# Patient Record
Sex: FEMALE | Race: WHITE | NOT HISPANIC OR LATINO | Employment: OTHER | URBAN - METROPOLITAN AREA
[De-identification: names, ages, dates, MRNs, and addresses within clinical notes are randomized per-mention and may not be internally consistent; named-entity substitution may affect disease eponyms.]

---

## 2017-01-17 ENCOUNTER — HOSPITAL ENCOUNTER (EMERGENCY)
Facility: HOSPITAL | Age: 82
Discharge: HOME | End: 2017-01-17
Attending: EMERGENCY MEDICINE | Admitting: EMERGENCY MEDICINE

## 2017-01-17 ENCOUNTER — APPOINTMENT (OUTPATIENT)
Dept: GENERAL RADIOLOGY | Facility: HOSPITAL | Age: 82
End: 2017-01-17

## 2017-01-17 ENCOUNTER — APPOINTMENT (OUTPATIENT)
Dept: CT IMAGING | Facility: HOSPITAL | Age: 82
End: 2017-01-17

## 2017-01-17 VITALS
HEIGHT: 66 IN | TEMPERATURE: 98.1 F | RESPIRATION RATE: 16 BRPM | OXYGEN SATURATION: 95 % | SYSTOLIC BLOOD PRESSURE: 156 MMHG | WEIGHT: 160 LBS | HEART RATE: 84 BPM | BODY MASS INDEX: 25.71 KG/M2 | DIASTOLIC BLOOD PRESSURE: 66 MMHG

## 2017-01-17 DIAGNOSIS — S51.011A LACERATION OF RIGHT ELBOW, INITIAL ENCOUNTER: ICD-10-CM
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
## 2017-01-17 LAB
ALBUMIN SERPL-MCNC: 3.8 G/DL (ref 3.2–4.8)
ALBUMIN/GLOB SERPL: 1.4 G/DL (ref 1.5–2.5)
ALP SERPL-CCNC: 74 U/L (ref 25–100)
ALT SERPL W P-5'-P-CCNC: 9 U/L (ref 7–40)
ANION GAP SERPL CALCULATED.3IONS-SCNC: 7 MMOL/L (ref 3–11)
AST SERPL-CCNC: 20 U/L (ref 0–33)
BACTERIA UR QL AUTO: ABNORMAL /HPF
BASOPHILS # BLD AUTO: 0.03 10*3/MM3 (ref 0–0.2)
BASOPHILS NFR BLD AUTO: 0.4 % (ref 0–1)
BILIRUB SERPL-MCNC: 0.4 MG/DL (ref 0.3–1.2)
BILIRUB UR QL STRIP: NEGATIVE
BUN BLD-MCNC: 19 MG/DL (ref 9–23)
BUN/CREAT SERPL: 15.8 (ref 7–25)
CALCIUM SPEC-SCNC: 9.6 MG/DL (ref 8.7–10.4)
CHLORIDE SERPL-SCNC: 106 MMOL/L (ref 99–109)
CK SERPL-CCNC: 91 U/L (ref 26–174)
CLARITY UR: ABNORMAL
CO2 SERPL-SCNC: 30 MMOL/L (ref 20–31)
COLOR UR: YELLOW
CREAT BLD-MCNC: 1.2 MG/DL (ref 0.6–1.3)
DEPRECATED RDW RBC AUTO: 44.4 FL (ref 37–54)
EOSINOPHIL # BLD AUTO: 0.09 10*3/MM3 (ref 0.1–0.3)
EOSINOPHIL NFR BLD AUTO: 1.3 % (ref 0–3)
ERYTHROCYTE [DISTWIDTH] IN BLOOD BY AUTOMATED COUNT: 12.7 % (ref 11.3–14.5)
GFR SERPL CREATININE-BSD FRML MDRD: 42 ML/MIN/1.73
GLOBULIN UR ELPH-MCNC: 2.7 GM/DL
GLUCOSE BLD-MCNC: 78 MG/DL (ref 70–100)
GLUCOSE UR STRIP-MCNC: NEGATIVE MG/DL
HCT VFR BLD AUTO: 32.7 % (ref 34.5–44)
HGB BLD-MCNC: 10.7 G/DL (ref 11.5–15.5)
HGB UR QL STRIP.AUTO: NEGATIVE
HOLD SPECIMEN: NORMAL
HOLD SPECIMEN: NORMAL
HYALINE CASTS UR QL AUTO: ABNORMAL /LPF
IMM GRANULOCYTES # BLD: 0.01 10*3/MM3 (ref 0–0.03)
IMM GRANULOCYTES NFR BLD: 0.1 % (ref 0–0.6)
KETONES UR QL STRIP: NEGATIVE
LEUKOCYTE ESTERASE UR QL STRIP.AUTO: NEGATIVE
LYMPHOCYTES # BLD AUTO: 1.79 10*3/MM3 (ref 0.6–4.8)
LYMPHOCYTES NFR BLD AUTO: 25.7 % (ref 24–44)
MCH RBC QN AUTO: 31.4 PG (ref 27–31)
MCHC RBC AUTO-ENTMCNC: 32.7 G/DL (ref 32–36)
MCV RBC AUTO: 95.9 FL (ref 80–99)
MONOCYTES # BLD AUTO: 0.73 10*3/MM3 (ref 0–1)
MONOCYTES NFR BLD AUTO: 10.5 % (ref 0–12)
NEUTROPHILS # BLD AUTO: 4.31 10*3/MM3 (ref 1.5–8.3)
NEUTROPHILS NFR BLD AUTO: 62 % (ref 41–71)
NITRITE UR QL STRIP: NEGATIVE
PH UR STRIP.AUTO: 5.5 [PH] (ref 5–8)
PLATELET # BLD AUTO: 230 10*3/MM3 (ref 150–450)
PMV BLD AUTO: 10.4 FL (ref 6–12)
POTASSIUM BLD-SCNC: 4.1 MMOL/L (ref 3.5–5.5)
PROT SERPL-MCNC: 6.5 G/DL (ref 5.7–8.2)
PROT UR QL STRIP: NEGATIVE
RBC # BLD AUTO: 3.41 10*6/MM3 (ref 3.89–5.14)
RBC # UR: ABNORMAL /HPF
REF LAB TEST METHOD: ABNORMAL
SODIUM BLD-SCNC: 143 MMOL/L (ref 132–146)
SP GR UR STRIP: 1.01 (ref 1–1.03)
SQUAMOUS #/AREA URNS HPF: ABNORMAL /HPF
TROPONIN I SERPL-MCNC: 0.1 NG/ML (ref 0–0.07)
TROPONIN I SERPL-MCNC: 0.11 NG/ML (ref 0–0.07)
UROBILINOGEN UR QL STRIP: ABNORMAL
WBC NRBC COR # BLD: 6.96 10*3/MM3 (ref 3.5–10.8)
WBC UR QL AUTO: ABNORMAL /HPF
WHOLE BLOOD HOLD SPECIMEN: NORMAL
WHOLE BLOOD HOLD SPECIMEN: NORMAL

## 2017-01-17 PROCEDURE — 80053 COMPREHEN METABOLIC PANEL: CPT | Performed by: EMERGENCY MEDICINE

## 2017-01-17 PROCEDURE — 70450 CT HEAD/BRAIN W/O DYE: CPT

## 2017-01-17 PROCEDURE — 82550 ASSAY OF CK (CPK): CPT | Performed by: EMERGENCY MEDICINE

## 2017-01-17 PROCEDURE — 73070 X-RAY EXAM OF ELBOW: CPT

## 2017-01-17 PROCEDURE — 71010 HC CHEST PA OR AP: CPT

## 2017-01-17 PROCEDURE — 84484 ASSAY OF TROPONIN QUANT: CPT

## 2017-01-17 PROCEDURE — 81001 URINALYSIS AUTO W/SCOPE: CPT | Performed by: EMERGENCY MEDICINE

## 2017-01-17 PROCEDURE — 99285 EMERGENCY DEPT VISIT HI MDM: CPT

## 2017-01-17 PROCEDURE — 85025 COMPLETE CBC W/AUTO DIFF WBC: CPT | Performed by: EMERGENCY MEDICINE

## 2017-01-17 PROCEDURE — 36415 COLL VENOUS BLD VENIPUNCTURE: CPT

## 2017-01-17 PROCEDURE — 93005 ELECTROCARDIOGRAM TRACING: CPT | Performed by: EMERGENCY MEDICINE

## 2017-01-17 RX ORDER — SODIUM CHLORIDE 0.9 % (FLUSH) 0.9 %
10 SYRINGE (ML) INJECTION AS NEEDED
Status: DISCONTINUED | OUTPATIENT
Start: 2017-01-17 | End: 2017-01-17 | Stop reason: HOSPADM

## 2017-01-17 RX ORDER — PANTOPRAZOLE SODIUM 20 MG/1
20 TABLET, DELAYED RELEASE ORAL DAILY
COMMUNITY
End: 2017-04-04

## 2017-01-17 RX ORDER — LIDOCAINE HYDROCHLORIDE 10 MG/ML
10 INJECTION, SOLUTION INFILTRATION; PERINEURAL ONCE
Status: COMPLETED | OUTPATIENT
Start: 2017-01-17 | End: 2017-01-17

## 2017-01-17 RX ORDER — LIDOCAINE HYDROCHLORIDE 10 MG/ML
INJECTION, SOLUTION EPIDURAL; INFILTRATION; INTRACAUDAL; PERINEURAL
Status: COMPLETED
Start: 2017-01-17 | End: 2017-01-17

## 2017-01-17 RX ADMIN — LIDOCAINE HYDROCHLORIDE 5 ML: 10 INJECTION, SOLUTION EPIDURAL; INFILTRATION; INTRACAUDAL; PERINEURAL at 03:26

## 2017-01-17 RX ADMIN — LIDOCAINE HYDROCHLORIDE 5 ML: 10 INJECTION, SOLUTION INFILTRATION; PERINEURAL at 03:26

## 2017-01-17 NOTE — ED PROVIDER NOTES
Subjective   History of Present Illness    Review of Systems    Past Medical History   Diagnosis Date   • GERD (gastroesophageal reflux disease)    • [place]hard of hearing)        Allergies   Allergen Reactions   • Asa [Aspirin]        Past Surgical History   Procedure Laterality Date   • Joint replacement         History reviewed. No pertinent family history.    Social History     Social History   • Marital status:      Spouse name: N/A   • Number of children: N/A   • Years of education: N/A     Social History Main Topics   • Smoking status: Never Smoker   • Smokeless tobacco: None   • Alcohol use No   • Drug use: None   • Sexual activity: Not Asked     Other Topics Concern   • None     Social History Narrative   • None           Objective   Physical Exam    Laceration Repair  Date/Time: 1/17/2017 3:51 AM  Performed by: AMBROCIO FIGUEROA  Authorized by: BIRDIE PEREZ   Consent: Verbal consent obtained.  Risks and benefits: risks, benefits and alternatives were discussed  Consent given by: patient  Body area: upper extremity  Location details: right elbow  Laceration length: 8 cm  Foreign bodies: no foreign bodies  Anesthesia: local infiltration    Anesthesia:  Anesthesia: local infiltration  Local Anesthetic: lidocaine 1% without epinephrine   Anesthetic total: 8 mL  Preparation: Patient was prepped and draped in the usual sterile fashion.  Irrigation solution: saline  Irrigation method: syringe  Amount of cleaning: extensive  Debridement: none  Skin closure: 4-0 nylon  Number of sutures: 13  Technique: simple  Approximation: close  Approximation difficulty: simple  Dressing: 4x4 sterile gauze  Patient tolerance: Patient tolerated the procedure well with no immediate complications               ED Course  ED Course                  MDM    Final diagnoses:   None            NAZARIO Villalba  01/17/17 0353

## 2017-01-17 NOTE — ED PROVIDER NOTES
Subjective   HPI Comments: Meche Duarte is a 89 y.o.female who presents to the ED after a fall. Pt states that she went to bed, lost her balance and fell backwards, injured her right arm and was unable to get herself up. She laid on the floor until her family was able to hear her but she is unsure how long she was down. In the ED she states that she has a laceration to her right elbow but is largely pain free. She denies any neck pain, back pain, CP, abd pain, NVD, fevers or other acute complaints.     At baseline, pt is ambulatory with a walker.     Patient is a 89 y.o. female presenting with fall.   History provided by:  Patient  Fall   Mechanism of injury: fall    Injury location:  Shoulder/arm  Shoulder/arm injury location:  R elbow  Incident location:  Home  Time since incident: several hours ago.  Arrived directly from scene: yes    Fall:     Fall occurred: lost balance.    Entrapped after fall: no    Suspicion of alcohol use: no    Suspicion of drug use: no    Prior to arrival data:     Bystander interventions:  None    Patient ambulatory at scene: no      Loss of consciousness: no      Amnesic to event: no    Associated symptoms: no abdominal pain, no back pain, no chest pain, no loss of consciousness, no nausea, no neck pain, no seizures and no vomiting        Review of Systems   Cardiovascular: Negative for chest pain.   Gastrointestinal: Negative for abdominal pain, nausea and vomiting.   Musculoskeletal: Negative for back pain and neck pain.   Skin:        laceration   Neurological: Negative for seizures, loss of consciousness, syncope, weakness and numbness.   All other systems reviewed and are negative.      Past Medical History   Diagnosis Date   • GERD (gastroesophageal reflux disease)    • [place] (hard of hearing)        Allergies   Allergen Reactions   • Asa [Aspirin]        Past Surgical History   Procedure Laterality Date   • Joint replacement         History reviewed. No pertinent family  history.    Social History     Social History   • Marital status:      Spouse name: N/A   • Number of children: N/A   • Years of education: N/A     Social History Main Topics   • Smoking status: Never Smoker   • Smokeless tobacco: None   • Alcohol use No   • Drug use: None   • Sexual activity: Not Asked     Other Topics Concern   • None     Social History Narrative   • None         Objective   Physical Exam   Constitutional: She is oriented to person, place, and time. She appears well-developed and well-nourished. No distress.   HENT:   Head: Normocephalic and atraumatic.   Eyes: Conjunctivae are normal.   Neck: Trachea normal, normal range of motion and phonation normal. Neck supple. No JVD present.   Cardiovascular: Normal rate and regular rhythm.    Murmur (3/6 systolic murmur ) heard.  Pulmonary/Chest: Effort normal and breath sounds normal. No respiratory distress.   Abdominal: Soft. There is no tenderness.   Musculoskeletal: Normal range of motion. She exhibits no edema or deformity.   No significant TTP   Neurological: She is alert and oriented to person, place, and time. She has normal strength. Coordination normal.   able to lift all extremities against gravity.    Skin: Skin is warm and dry. She is not diaphoretic. No pallor.   large 4 cm laceration right elbow   Psychiatric: She has a normal mood and affect. Her speech is normal and behavior is normal.   Nursing note and vitals reviewed.      Procedures         ED Course  ED Course   Value Comment By Time   SCANNED EKG (Reviewed) Frantz Moore 01/17 0357       Course of Care      Lab Results (last 24 hours)     Procedure Component Value Units Date/Time    CK [[id number]]  (Normal) Collected:  01/17/17 0410    Specimen:  Blood Updated:  01/17/17 0446     Creatine Kinase 91 U/L     CBC & Differential [[id number]] Collected:  01/17/17 0410    Specimen:  Blood Updated:  01/17/17 0438    Narrative:       The following orders were created for panel order CBC &  Differential.  Procedure                               Abnormality         Status                     ---------                               -----------         ------                     CBC Auto Differential[[id number]]         Abnormal            Final result                 Please view results for these tests on the individual orders.    Comprehensive Metabolic Panel [[id number]]  (Abnormal) Collected:  01/17/17 0410    Specimen:  Blood Updated:  01/17/17 0446     Glucose 78 mg/dL      BUN 19 mg/dL      Creatinine 1.20 mg/dL      Sodium 143 mmol/L      Potassium 4.1 mmol/L      Chloride 106 mmol/L      CO2 30.0 mmol/L      Calcium 9.6 mg/dL      Total Protein 6.5 g/dL      Albumin 3.80 g/dL      ALT (SGPT) 9 U/L      AST (SGOT) 20 U/L      Alkaline Phosphatase 74 U/L      Total Bilirubin 0.4 mg/dL      eGFR Non African Amer 42 (L) mL/min/1.73      Globulin 2.7 gm/dL      A/G Ratio 1.4 (L) g/dL      BUN/Creatinine Ratio 15.8      Anion Gap 7.0 mmol/L     Narrative:       [employer] Guidelines    Stage                           Description                             GFR                      1                               Normal or High                          90+  2                               Mild decrease                            60-89  3                               Moderate decrease                   30-59  4                               Severe decrease                       15-29  5                               Kidney failure                             <15    CBC Auto Differential [[id number]]  (Abnormal) Collected:  01/17/17 0410    Specimen:  Blood Updated:  01/17/17 0438     WBC 6.96 10*3/mm3      RBC 3.41 (L) 10*6/mm3      Hemoglobin 10.7 (L) g/dL      Hematocrit 32.7 (L) %      MCV 95.9 fL      MCH 31.4 (H) pg      MCHC 32.7 g/dL      RDW 12.7 %      RDW-SD 44.4 fl      MPV 10.4 fL      Platelets 230 10*3/mm3      Neutrophil % 62.0 %      Lymphocyte % 25.7 %      Monocyte % 10.5 %       Eosinophil % 1.3 %      Basophil % 0.4 %      Immature Grans % 0.1 %      Neutrophils, Absolute 4.31 10*3/mm3      Lymphocytes, Absolute 1.79 10*3/mm3      Monocytes, Absolute 0.73 10*3/mm3      Eosinophils, Absolute 0.09 (L) 10*3/mm3      Basophils, Absolute 0.03 10*3/mm3      Immature Grans, Absolute 0.01 10*3/mm3     Urinalysis With / Culture If Indicated [[id number]]  (Abnormal) Collected:  01/17/17 0412    Specimen:  Urine from Urine, Catheter Updated:  01/17/17 0509     Color, UA Yellow      Appearance, UA Cloudy (A)      pH, UA 5.5      Specific Gravity, UA 1.008      Glucose, UA Negative      Ketones, UA Negative      Bilirubin, UA Negative      Blood, UA Negative      Protein, UA Negative      Leuk Esterase, UA Negative      Nitrite, UA Negative      Urobilinogen, UA 0.2 E.U./dL     Urinalysis, Microscopic Only [[id number]]  (Abnormal) Collected:  01/17/17 0412    Specimen:  Urine from Urine, Catheter Updated:  01/17/17 0509     RBC, UA 3-6 (A) /HPF      WBC, UA 0-2 (A) /HPF      Bacteria, UA None Seen /HPF      Squamous Epithelial Cells, UA 0-2 /HPF      Hyaline Casts, UA 0-6 /LPF      Methodology Automated Microscopy     POC Troponin, Rapid [[id number]]  (Abnormal) Collected:  01/17/17 0415    Specimen:  Blood Updated:  01/17/17 0435     Troponin I 0.10 (H) ng/mL       Serial Number: [id number]    : [id number]       POC Troponin, Rapid [[id number]]  (Abnormal) Collected:  01/17/17 0637    Specimen:  Blood Updated:  01/17/17 0655     Troponin I 0.11 (H) ng/mL       Serial Number: [id number]    : [id number]             Note: In addition to lab results from this visit, the labs listed above may include labs taken at another facility or during a different encounter within the last 24 hours. Please correlate lab times with ED admission and discharge times for further clarification of the services performed during this visit.    CT Head Without Contrast   Final Result   Abnormal     No acute intracranial  findings.         THIS DOCUMENT HAS BEEN ELECTRONICALLY SIGNED BY BIANCA NATHAN MD      XR Chest 1 View    (Results Pending)   XR Elbow 2 View Right    (Results Pending)       Vitals:    01/17/17 0600 01/17/17 0658 01/17/17 0700 01/17/17 0703   BP: 161/66  156/66    BP Location:       Pulse: 62 77  84   Resp:       Temp:       TempSrc:       SpO2: 95% 98% 95% 95%   Weight:       Height:           Medications   sodium chloride 0.9 % flush 10 mL (not administered)   lidocaine (XYLOCAINE) 1 % injection 10 mL (5 mL Injection Given 1/17/17 0326)       ECG/EMG Results (last 24 hours)     Procedure Component Value Units Date/Time    ECG 12 Lead [[id number]] Collected:  01/17/17 0624     Updated:  01/17/17 0703    ECG 12 Lead [[id number]] Collected:  01/17/17 0257     Updated:  01/17/17 0704                          MDM    Final diagnoses:   Fall from standing, initial encounter   Laceration of right elbow, initial encounter       Documentation assistance provided by oziel Moore.  Information recorded by the scribe was done at my direction and has been verified and validated by me.     Frantz Moore  01/17/17 0250       Jarrett Gillette DO  01/17/17 0809

## 2017-04-04 ENCOUNTER — HOSPITAL ENCOUNTER (INPATIENT)
Facility: HOSPITAL | Age: 82
LOS: 3 days | Discharge: NURSING, REHAB OR LONG-TERM CARE | End: 2017-04-07
Attending: EMERGENCY MEDICINE | Admitting: HOSPITALIST

## 2017-04-04 ENCOUNTER — APPOINTMENT (OUTPATIENT)
Dept: CT IMAGING | Facility: HOSPITAL | Age: 82
End: 2017-04-04

## 2017-04-04 ENCOUNTER — APPOINTMENT (OUTPATIENT)
Dept: MRI IMAGING | Facility: HOSPITAL | Age: 82
End: 2017-04-04

## 2017-04-04 ENCOUNTER — APPOINTMENT (OUTPATIENT)
Dept: GENERAL RADIOLOGY | Facility: HOSPITAL | Age: 82
End: 2017-04-04

## 2017-04-04 DIAGNOSIS — R41.82 ALTERED MENTAL STATUS, UNSPECIFIED ALTERED MENTAL STATUS TYPE: ICD-10-CM

## 2017-04-04 DIAGNOSIS — Z78.9 IMPAIRED MOBILITY AND ADLS: ICD-10-CM

## 2017-04-04 DIAGNOSIS — R53.1 WEAKNESS: Primary | ICD-10-CM

## 2017-04-04 DIAGNOSIS — Z74.09 IMPAIRED MOBILITY AND ADLS: ICD-10-CM

## 2017-04-04 DIAGNOSIS — J18.9 PNEUMONIA OF LEFT LOWER LOBE DUE TO INFECTIOUS ORGANISM: ICD-10-CM

## 2017-04-04 PROBLEM — R06.02 SHORTNESS OF BREATH: Status: ACTIVE | Noted: 2017-04-04

## 2017-04-04 PROBLEM — H91.90 HOH (HARD OF HEARING): Status: ACTIVE | Noted: 2017-04-04

## 2017-04-04 PROBLEM — I10 HTN (HYPERTENSION): Status: ACTIVE | Noted: 2017-04-04

## 2017-04-04 PROBLEM — N17.9 AKI (ACUTE KIDNEY INJURY) (HCC): Status: ACTIVE | Noted: 2017-04-04

## 2017-04-04 PROBLEM — G25.81 RLS (RESTLESS LEGS SYNDROME): Status: ACTIVE | Noted: 2017-04-04

## 2017-04-04 PROBLEM — J45.909 ASTHMA: Status: ACTIVE | Noted: 2017-04-04

## 2017-04-04 LAB
ALBUMIN SERPL-MCNC: 3.7 G/DL (ref 3.2–4.8)
ALBUMIN/GLOB SERPL: 1.3 G/DL (ref 1.5–2.5)
ALP SERPL-CCNC: 79 U/L (ref 25–100)
ALT SERPL W P-5'-P-CCNC: 3 U/L (ref 7–40)
ANION GAP SERPL CALCULATED.3IONS-SCNC: 10 MMOL/L (ref 3–11)
AST SERPL-CCNC: 19 U/L (ref 0–33)
BASOPHILS # BLD AUTO: 0.03 10*3/MM3 (ref 0–0.2)
BASOPHILS NFR BLD AUTO: 0.5 % (ref 0–1)
BILIRUB SERPL-MCNC: 0.3 MG/DL (ref 0.3–1.2)
BILIRUB UR QL STRIP: NEGATIVE
BNP SERPL-MCNC: 200 PG/ML (ref 0–100)
BUN BLD-MCNC: 19 MG/DL (ref 9–23)
BUN/CREAT SERPL: 13.6 (ref 7–25)
CALCIUM SPEC-SCNC: 9.3 MG/DL (ref 8.7–10.4)
CHLORIDE SERPL-SCNC: 105 MMOL/L (ref 99–109)
CLARITY UR: CLEAR
CO2 SERPL-SCNC: 26 MMOL/L (ref 20–31)
COLOR UR: YELLOW
CREAT BLD-MCNC: 1.4 MG/DL (ref 0.6–1.3)
D-LACTATE SERPL-SCNC: 0.6 MMOL/L (ref 0.5–2)
DEPRECATED RDW RBC AUTO: 47.2 FL (ref 37–54)
EOSINOPHIL # BLD AUTO: 0.1 10*3/MM3 (ref 0.1–0.3)
EOSINOPHIL NFR BLD AUTO: 1.8 % (ref 0–3)
ERYTHROCYTE [DISTWIDTH] IN BLOOD BY AUTOMATED COUNT: 13.2 % (ref 11.3–14.5)
GFR SERPL CREATININE-BSD FRML MDRD: 35 ML/MIN/1.73
GLOBULIN UR ELPH-MCNC: 2.9 GM/DL
GLUCOSE BLD-MCNC: 84 MG/DL (ref 70–100)
GLUCOSE UR STRIP-MCNC: NEGATIVE MG/DL
HCT VFR BLD AUTO: 34.8 % (ref 34.5–44)
HGB BLD-MCNC: 10.9 G/DL (ref 11.5–15.5)
HGB UR QL STRIP.AUTO: NEGATIVE
HOLD SPECIMEN: NORMAL
HOLD SPECIMEN: NORMAL
IMM GRANULOCYTES # BLD: 0.02 10*3/MM3 (ref 0–0.03)
IMM GRANULOCYTES NFR BLD: 0.4 % (ref 0–0.6)
INR PPP: 1
KETONES UR QL STRIP: NEGATIVE
LEUKOCYTE ESTERASE UR QL STRIP.AUTO: NEGATIVE
LIPASE SERPL-CCNC: 50 U/L (ref 6–51)
LYMPHOCYTES # BLD AUTO: 1.56 10*3/MM3 (ref 0.6–4.8)
LYMPHOCYTES NFR BLD AUTO: 28.2 % (ref 24–44)
MCH RBC QN AUTO: 30.6 PG (ref 27–31)
MCHC RBC AUTO-ENTMCNC: 31.3 G/DL (ref 32–36)
MCV RBC AUTO: 97.8 FL (ref 80–99)
MONOCYTES # BLD AUTO: 0.42 10*3/MM3 (ref 0–1)
MONOCYTES NFR BLD AUTO: 7.6 % (ref 0–12)
NEUTROPHILS # BLD AUTO: 3.4 10*3/MM3 (ref 1.5–8.3)
NEUTROPHILS NFR BLD AUTO: 61.5 % (ref 41–71)
NITRITE UR QL STRIP: NEGATIVE
PH UR STRIP.AUTO: 6.5 [PH] (ref 5–8)
PLATELET # BLD AUTO: 276 10*3/MM3 (ref 150–450)
PMV BLD AUTO: 10 FL (ref 6–12)
POTASSIUM BLD-SCNC: 4.8 MMOL/L (ref 3.5–5.5)
PROT SERPL-MCNC: 6.6 G/DL (ref 5.7–8.2)
PROT UR QL STRIP: NEGATIVE
PROTHROMBIN TIME: 10.9 SECONDS (ref 9.6–11.5)
RBC # BLD AUTO: 3.56 10*6/MM3 (ref 3.89–5.14)
SODIUM BLD-SCNC: 141 MMOL/L (ref 132–146)
SP GR UR STRIP: 1.01 (ref 1–1.03)
TROPONIN I SERPL-MCNC: 0 NG/ML (ref 0–0.07)
UROBILINOGEN UR QL STRIP: NORMAL
WBC NRBC COR # BLD: 5.53 10*3/MM3 (ref 3.5–10.8)
WHOLE BLOOD HOLD SPECIMEN: NORMAL
WHOLE BLOOD HOLD SPECIMEN: NORMAL

## 2017-04-04 PROCEDURE — 99223 1ST HOSP IP/OBS HIGH 75: CPT | Performed by: FAMILY MEDICINE

## 2017-04-04 PROCEDURE — 84484 ASSAY OF TROPONIN QUANT: CPT

## 2017-04-04 PROCEDURE — 25010000003 CEFTRIAXONE PER 250 MG: Performed by: FAMILY MEDICINE

## 2017-04-04 PROCEDURE — 81003 URINALYSIS AUTO W/O SCOPE: CPT | Performed by: EMERGENCY MEDICINE

## 2017-04-04 PROCEDURE — 25010000002 HEPARIN (PORCINE) PER 1000 UNITS: Performed by: NURSE PRACTITIONER

## 2017-04-04 PROCEDURE — 70450 CT HEAD/BRAIN W/O DYE: CPT

## 2017-04-04 PROCEDURE — 83690 ASSAY OF LIPASE: CPT | Performed by: EMERGENCY MEDICINE

## 2017-04-04 PROCEDURE — 87086 URINE CULTURE/COLONY COUNT: CPT | Performed by: EMERGENCY MEDICINE

## 2017-04-04 PROCEDURE — 71010 HC CHEST PA OR AP: CPT

## 2017-04-04 PROCEDURE — 85610 PROTHROMBIN TIME: CPT | Performed by: EMERGENCY MEDICINE

## 2017-04-04 PROCEDURE — 87040 BLOOD CULTURE FOR BACTERIA: CPT | Performed by: EMERGENCY MEDICINE

## 2017-04-04 PROCEDURE — 80053 COMPREHEN METABOLIC PANEL: CPT | Performed by: EMERGENCY MEDICINE

## 2017-04-04 PROCEDURE — 94640 AIRWAY INHALATION TREATMENT: CPT

## 2017-04-04 PROCEDURE — 70551 MRI BRAIN STEM W/O DYE: CPT

## 2017-04-04 PROCEDURE — 93005 ELECTROCARDIOGRAM TRACING: CPT | Performed by: EMERGENCY MEDICINE

## 2017-04-04 PROCEDURE — 83880 ASSAY OF NATRIURETIC PEPTIDE: CPT | Performed by: EMERGENCY MEDICINE

## 2017-04-04 PROCEDURE — 71250 CT THORAX DX C-: CPT

## 2017-04-04 PROCEDURE — 99285 EMERGENCY DEPT VISIT HI MDM: CPT

## 2017-04-04 PROCEDURE — 85025 COMPLETE CBC W/AUTO DIFF WBC: CPT | Performed by: EMERGENCY MEDICINE

## 2017-04-04 PROCEDURE — 83605 ASSAY OF LACTIC ACID: CPT | Performed by: EMERGENCY MEDICINE

## 2017-04-04 RX ORDER — DOCUSATE SODIUM 100 MG/1
100 CAPSULE, LIQUID FILLED ORAL DAILY PRN
Status: DISCONTINUED | OUTPATIENT
Start: 2017-04-04 | End: 2017-04-07 | Stop reason: HOSPADM

## 2017-04-04 RX ORDER — SODIUM CHLORIDE 9 MG/ML
75 INJECTION, SOLUTION INTRAVENOUS CONTINUOUS
Status: DISCONTINUED | OUTPATIENT
Start: 2017-04-04 | End: 2017-04-07 | Stop reason: HOSPADM

## 2017-04-04 RX ORDER — CARBIDOPA AND LEVODOPA 50; 200 MG/1; MG/1
1 TABLET, EXTENDED RELEASE ORAL 2 TIMES DAILY
COMMUNITY
End: 2017-08-15

## 2017-04-04 RX ORDER — CARBIDOPA AND LEVODOPA 50; 200 MG/1; MG/1
1 TABLET, EXTENDED RELEASE ORAL 2 TIMES DAILY
Status: DISCONTINUED | OUTPATIENT
Start: 2017-04-04 | End: 2017-04-07 | Stop reason: HOSPADM

## 2017-04-04 RX ORDER — IPRATROPIUM BROMIDE AND ALBUTEROL SULFATE 2.5; .5 MG/3ML; MG/3ML
3 SOLUTION RESPIRATORY (INHALATION)
Status: DISCONTINUED | OUTPATIENT
Start: 2017-04-04 | End: 2017-04-07 | Stop reason: HOSPADM

## 2017-04-04 RX ORDER — ACETAMINOPHEN 325 MG/1
650 TABLET ORAL EVERY 4 HOURS PRN
Status: DISCONTINUED | OUTPATIENT
Start: 2017-04-04 | End: 2017-04-07 | Stop reason: HOSPADM

## 2017-04-04 RX ORDER — DOCUSATE SODIUM 100 MG/1
100 CAPSULE, LIQUID FILLED ORAL DAILY PRN
COMMUNITY
End: 2017-08-15

## 2017-04-04 RX ORDER — LOSARTAN POTASSIUM 50 MG/1
50 TABLET ORAL DAILY
COMMUNITY
End: 2017-04-07 | Stop reason: HOSPADM

## 2017-04-04 RX ORDER — PRAMIPEXOLE DIHYDROCHLORIDE 0.5 MG/1
0.5 TABLET ORAL 3 TIMES DAILY
COMMUNITY
End: 2017-04-07 | Stop reason: HOSPADM

## 2017-04-04 RX ORDER — ISOSORBIDE DINITRATE 10 MG/1
5 TABLET ORAL 2 TIMES DAILY
Status: DISCONTINUED | OUTPATIENT
Start: 2017-04-04 | End: 2017-04-05

## 2017-04-04 RX ORDER — ONDANSETRON 4 MG/1
4 TABLET, FILM COATED ORAL EVERY 6 HOURS PRN
Status: DISCONTINUED | OUTPATIENT
Start: 2017-04-04 | End: 2017-04-07 | Stop reason: HOSPADM

## 2017-04-04 RX ORDER — OFLOXACIN 3 MG/ML
1 SOLUTION/ DROPS OPHTHALMIC 4 TIMES DAILY
Status: DISCONTINUED | OUTPATIENT
Start: 2017-04-04 | End: 2017-04-05

## 2017-04-04 RX ORDER — PANTOPRAZOLE SODIUM 40 MG/1
40 TABLET, DELAYED RELEASE ORAL DAILY
COMMUNITY
End: 2017-08-15

## 2017-04-04 RX ORDER — TRAMADOL HYDROCHLORIDE 50 MG/1
50 TABLET ORAL EVERY 6 HOURS PRN
COMMUNITY
End: 2017-04-07 | Stop reason: HOSPADM

## 2017-04-04 RX ORDER — CEFTRIAXONE SODIUM 1 G/50ML
1 INJECTION, SOLUTION INTRAVENOUS EVERY 24 HOURS
Status: DISCONTINUED | OUTPATIENT
Start: 2017-04-04 | End: 2017-04-06

## 2017-04-04 RX ORDER — HEPARIN SODIUM 5000 [USP'U]/ML
5000 INJECTION, SOLUTION INTRAVENOUS; SUBCUTANEOUS EVERY 12 HOURS SCHEDULED
Status: DISCONTINUED | OUTPATIENT
Start: 2017-04-04 | End: 2017-04-07 | Stop reason: HOSPADM

## 2017-04-04 RX ORDER — LEVOFLOXACIN 5 MG/ML
750 INJECTION, SOLUTION INTRAVENOUS ONCE
Status: DISCONTINUED | OUTPATIENT
Start: 2017-04-04 | End: 2017-04-04

## 2017-04-04 RX ORDER — SODIUM CHLORIDE 0.9 % (FLUSH) 0.9 %
1-10 SYRINGE (ML) INJECTION AS NEEDED
Status: DISCONTINUED | OUTPATIENT
Start: 2017-04-04 | End: 2017-04-07 | Stop reason: HOSPADM

## 2017-04-04 RX ORDER — PANTOPRAZOLE SODIUM 40 MG/1
40 TABLET, DELAYED RELEASE ORAL DAILY
Status: DISCONTINUED | OUTPATIENT
Start: 2017-04-05 | End: 2017-04-07 | Stop reason: HOSPADM

## 2017-04-04 RX ORDER — OFLOXACIN 3 MG/ML
1 SOLUTION/ DROPS OPHTHALMIC 4 TIMES DAILY
COMMUNITY
End: 2017-08-15

## 2017-04-04 RX ORDER — BUDESONIDE AND FORMOTEROL FUMARATE DIHYDRATE 160; 4.5 UG/1; UG/1
2 AEROSOL RESPIRATORY (INHALATION)
Status: DISCONTINUED | OUTPATIENT
Start: 2017-04-04 | End: 2017-04-07 | Stop reason: HOSPADM

## 2017-04-04 RX ORDER — OFLOXACIN 3 MG/ML
5 SOLUTION AURICULAR (OTIC) DAILY
COMMUNITY
End: 2017-04-04

## 2017-04-04 RX ORDER — PRAMIPEXOLE DIHYDROCHLORIDE 0.25 MG/1
0.5 TABLET ORAL NIGHTLY
Status: DISCONTINUED | OUTPATIENT
Start: 2017-04-04 | End: 2017-04-07 | Stop reason: HOSPADM

## 2017-04-04 RX ORDER — CALCIUM CARBONATE 200(500)MG
2 TABLET,CHEWABLE ORAL 2 TIMES DAILY PRN
Status: DISCONTINUED | OUTPATIENT
Start: 2017-04-04 | End: 2017-04-07 | Stop reason: HOSPADM

## 2017-04-04 RX ORDER — SODIUM CHLORIDE 9 MG/ML
125 INJECTION, SOLUTION INTRAVENOUS CONTINUOUS
Status: DISCONTINUED | OUTPATIENT
Start: 2017-04-04 | End: 2017-04-04

## 2017-04-04 RX ORDER — ISOSORBIDE DINITRATE 5 MG/1
5 TABLET ORAL 2 TIMES DAILY
COMMUNITY
End: 2017-04-07 | Stop reason: HOSPADM

## 2017-04-04 RX ORDER — ALBUTEROL SULFATE 90 UG/1
2 AEROSOL, METERED RESPIRATORY (INHALATION) EVERY 4 HOURS PRN
COMMUNITY
End: 2017-08-15

## 2017-04-04 RX ORDER — SODIUM CHLORIDE 0.9 % (FLUSH) 0.9 %
10 SYRINGE (ML) INJECTION AS NEEDED
Status: DISCONTINUED | OUTPATIENT
Start: 2017-04-04 | End: 2017-04-07 | Stop reason: HOSPADM

## 2017-04-04 RX ORDER — PREDNISOLONE SODIUM PHOSPHATE 10 MG/ML
1 SOLUTION/ DROPS OPHTHALMIC 2 TIMES DAILY
COMMUNITY
End: 2017-08-15

## 2017-04-04 RX ADMIN — DOXYCYCLINE 100 MG: 100 INJECTION, POWDER, LYOPHILIZED, FOR SOLUTION INTRAVENOUS at 21:56

## 2017-04-04 RX ADMIN — PRAMIPEXOLE DIHYDROCHLORIDE 0.5 MG: 0.25 TABLET ORAL at 20:40

## 2017-04-04 RX ADMIN — SODIUM CHLORIDE 75 ML/HR: 9 INJECTION, SOLUTION INTRAVENOUS at 20:47

## 2017-04-04 RX ADMIN — SODIUM CHLORIDE 500 ML: 9 INJECTION, SOLUTION INTRAVENOUS at 12:14

## 2017-04-04 RX ADMIN — CEFTRIAXONE SODIUM 1 G: 1 INJECTION, SOLUTION INTRAVENOUS at 21:56

## 2017-04-04 RX ADMIN — CARBIDOPA AND LEVODOPA 1 TABLET: 50; 200 TABLET, EXTENDED RELEASE ORAL at 20:40

## 2017-04-04 RX ADMIN — HEPARIN SODIUM 5000 UNITS: 5000 INJECTION, SOLUTION INTRAVENOUS; SUBCUTANEOUS at 20:40

## 2017-04-04 RX ADMIN — BUDESONIDE AND FORMOTEROL FUMARATE DIHYDRATE 2 PUFF: 160; 4.5 AEROSOL RESPIRATORY (INHALATION) at 21:11

## 2017-04-04 RX ADMIN — ISOSORBIDE DINITRATE 5 MG: 10 TABLET ORAL at 20:41

## 2017-04-04 RX ADMIN — OFLOXACIN 1 DROP: 3 SOLUTION/ DROPS OPHTHALMIC at 20:40

## 2017-04-04 NOTE — H&P
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  She lives with her daughter who is her POA in [place].  Daughter reports that she has been getting progressively more weak with frequent falls.  She fell in 1/2017 requiring an ED visit for sutures to her right elbow.  She last fell on Sunday without any reported injury or loss of consciousness.  [facility] has been coming to the house for the past 2 weeks due to weakness and when nurse came today patient was lethargic and not following commands.  EMS was called and reports patient had one instance of urinary incontinence en route.  Patient has not had any recent medication changes or recent illnesses.  Patient denies chest pain, abdominal pain, fever, chills, nausea, vomiting, diarrhea or headache.  She states her last BM was on Sunday.  She denies dizziness or lightheadedness and states her legs just give out on her when she falls.  Daughter reports that her PCP is [facility] and they recently started her on inhalers for wheezing.  Patient does c/o intermittent shortness of breath but not requiring oxygen.   At time of exam, she was alert and orient x 3 and able to answer all questions. CXR in ED showed a left retrocardiac infiltrate.  Labs unremarkable except mild ALMITA with creatinine 1.4 and anema with H/H 10.9/34.8 but appears to be at baseline.  .  CT of head was negative.  Patient will be admitted to the hospital medicine service for further evaluation and management.  MRI of brain ordered and CT of chest to further evaluate abnormal findings on  CXR.        Review of Systems   Constitutional: Positive for activity change and fatigue. Negative for chills and fever.   HENT: Negative.    Eyes: Negative.    Respiratory: Positive for shortness of breath. Negative for cough and wheezing.    Cardiovascular: Negative for chest pain and leg swelling.   Gastrointestinal: Positive for constipation. Negative for abdominal pain, diarrhea and vomiting.   Endocrine: Negative.    Genitourinary: Negative.    Musculoskeletal: Negative.    Skin: Negative.    Neurological: Positive for weakness. Negative for dizziness, numbness and headaches.   Psychiatric/Behavioral: Negative for confusion.          Past Medical History:   Diagnosis Date   • Asthma    • CHF (congestive heart failure)    • GERD (gastroesophageal reflux disease)    • [place] (hard of hearing)    • Hypertension        Past Surgical History:   Procedure Laterality Date   • JOINT REPLACEMENT         History reviewed. No pertinent family history.    Social History     Social History   • Marital status:      Spouse name: N/A   • Number of children: N/A   • Years of education: N/A     Occupational History   • Not on file.     Social History Main Topics   • Smoking status: Never Smoker   • Smokeless tobacco: Not on file   • Alcohol use No   • Drug use: Not on file   • Sexual activity: Not on file     Other Topics Concern   • Not on file     Social History Narrative   • No narrative on file       Medications:  Prescriptions Prior to Admission   Medication Sig Dispense Refill Last Dose   • albuterol (PROVENTIL HFA;VENTOLIN HFA) 108 (90 BASE) MCG/ACT inhaler Inhale 2 puffs Every 4 (Four) Hours As Needed for Wheezing.      • carbidopa-levodopa CR (SINEMET CR)  MG per CR tablet Take 1 tablet by mouth 2 (Two) Times a Day.      • docusate sodium (COLACE) 100 MG capsule Take 100 mg by mouth Daily As Needed for Constipation.      • fluticasone-salmeterol (ADVAIR) 250-50 MCG/DOSE DISKUS Inhale 2 puffs 2 (Two) Times a  "Day.      • isosorbide dinitrate (ISORDIL) 5 MG tablet Take 5 mg by mouth 2 (Two) Times a Day.      • losartan (COZAAR) 50 MG tablet Take 50 mg by mouth Daily.      • ofloxacin (OCUFLOX) 0.3 % ophthalmic solution Administer 1 drop into the left eye 4 (Four) Times a Day.      • pantoprazole (PROTONIX) 40 MG EC tablet Take 40 mg by mouth Daily.      • pramipexole (MIRAPEX) 0.5 MG tablet Take 0.5 mg by mouth 3 (Three) Times a Day.      • prednisoLONE sodium phosphate (INFLAMASE FORTE) 1 % ophthalmic solution Administer 1 drop to the right eye 2 (Two) Times a Day.      • traMADol (ULTRAM) 50 MG tablet Take 50 mg by mouth Every 6 (Six) Hours As Needed for Moderate Pain (4-6).          Allergies:  Allergies   Allergen Reactions   • Asa [Aspirin]          Objective     Physical Exam:  Vital Signs: /65  Pulse 70  Temp 98.6 °F (37 °C)  Resp 18  Ht 65\" (165.1 cm)  Wt 150 lb (68 kg)  SpO2 96%  BMI 24.96 kg/m2  Physical Exam   Constitutional: She is oriented to person, place, and time. She appears well-developed and well-nourished. No distress.   HENT:   Head: Normocephalic and atraumatic.   Poor dentition    Eyes: Conjunctivae are normal. No scleral icterus.   Neck: Normal range of motion. Neck supple.   Cardiovascular: Normal rate and regular rhythm.    Murmur heard.  Pulmonary/Chest: Effort normal and breath sounds normal. No respiratory distress. She has no wheezes.   Abdominal: Soft. Bowel sounds are normal. She exhibits no distension. There is tenderness (left quadrant). There is no guarding.   Musculoskeletal: Normal range of motion. She exhibits no edema.   Neurological: She is alert and oriented to person, place, and time. No cranial nerve deficit.   Follows commands and answers questions.  Able to do finger to nose but very slow   Skin: Skin is warm and dry. No erythema.   Psychiatric: She has a normal mood and affect. Her behavior is normal.       Results Reviewed:    Results from last 7 days  Lab Units " 04/04/17  1242   WBC 10*3/mm3 5.53   HEMOGLOBIN g/dL 10.9*   PLATELETS 10*3/mm3 276       Results from last 7 days  Lab Units 04/04/17  1242   SODIUM mmol/L 141   POTASSIUM mmol/L 4.8   TOTAL CO2 mmol/L 26.0   CREATININE mg/dL 1.40*   GLUCOSE mg/dL 84   CALCIUM mg/dL 9.3     Imaging Results (last 72 hours)     Procedure Component Value Units Date/Time    CT Head Without Contrast [[id number]] Updated:  04/04/17 1344    CT Chest Without Contrast [[id number]] Collected:  04/04/17 1542     Updated:  04/04/17 1601    Narrative:       EXAMINATION: CT CHEST WO CONTRAST- 04/04/2017     INDICATION: R53.1-Weakness; R41.82-Altered mental status, unspecified;  J18.9-Pneumonia, unspecified organism         TECHNIQUE:      CT data set of the chest and mediastinum was performed without  intravenous contrast enhancement.     The radiation dose reduction device was turned on for each scan per the  ALARA (As Low as Reasonably Achievable) protocol.     COMPARISON: Compared to chest radiographs performed prior to the  examination.     FINDINGS:   1. The suspected retrocardiac density was, indeed, densely calcified  mitral valve annulus. The patient has marked four-chamber cardiomegaly  and there is a mild amount of pericardial thickening.  2. In addition, some of the retrocardiac density is produced by a large  hiatus hernia with partial intrathoracic stomach.  3. There is a small airspace opacity with minimal crescentic  opacification and consolidation at the right lung base with a trace  effusion.  4. There are multiple hepatic cysts seen in all segments of the liver.  The largest of the hepatic cysts is in the right lower lobe and measures  6.6 cm. Please note that some large liver cysts can have a slight  increased risk of malignant degeneration. A solid liver mass is not  currently identified otherwise.  5. There is marked degenerative disease and arthropathy of the right  shoulder with bone on bone and there is a large right shoulder  effusion.  Please note this finding.  6. The adrenal glands remainder of the upper abdominal contents are  unremarkable. The extended window datasets demonstrate significant  centrilobular emphysema and obstructive lung disease with mild  interstitial fibrosis.       Impression:       1. The apparent lower lung or retrocardiac density is produced by a  large hiatus hernia with partial intrathoracic stomach in combination  with mitral annulus calcification which is exceedingly dense.   2. The patient has significant cardiomegaly with thickening of the  pericardium. Marked liver cysts are identified throughout all areas of  the liver as described and measured above. There is bone on bone with  right shoulder arthropathy and a large right shoulder effusion. There is  a crescentic consolidative small airspace opacity right base with trace  effusion. There is marked obstructive and fibrotic lung disease with  moderate centrilobular emphysema throughout both lungs. Central  mediastinal bulky adenopathy is otherwise not identified. Nonetheless,  please note the multiple abnormal findings     D:  04/04/2017  E:  04/04/2017        This report was finalized on 4/4/2017 3:59 PM by Dr. Jerry Du MD.       XR Chest 1 View [[id number]] Collected:  04/04/17 1623     Updated:  04/04/17 1631    Narrative:       EXAMINATION: XR CHEST 1 VIEW-04/04/2017:      INDICATION: Aspiration.      COMPARISON: NONE.     FINDINGS:   1. Cardiomegaly is noted. Some mild nodular densities are seen in the  retrocardiac region of the left lower lung. Some of this could be mitral  annulus calcification but this is somewhat more lateral than one would  expect.      2. Significant or extensive other airway disease, consolidation or free  fluid is not identified. The mid and upper lung zones are clear.       Impression:       1.  There is minimal airspace opacity in the retrocardiac left lower  lobe. Activity and acute status is indeterminate. It  appears to be new  from 01/17/2017.  2.  Otherwise, there is no other acute or active disease elsewhere.     D:  04/04/2017  E:  04/04/2017     This report was finalized on 4/4/2017 4:29 PM by Dr. Jerry Du MD.             I have personally reviewed and interpreted available lab data, radiology studies and ECG obtained at time of admission.     Assessment / Plan     Assessment/Problem List:   Active Problems:    Weakness    ALMITA (acute kidney injury)    HTN (hypertension)    RLS (restless legs syndrome)    Altered mental status    [place] (hard of hearing)    Asthma    Shortness of breath      Plan:    Altered mental status  -resolved  -neurology consult  -CT of head negative  -MRI brain pending    ALMITA  -creatinine 1.4  -NS bolus 500 mL in ED  -NS at 75 overnight  -monitor    Weakness  -PT/OT consult    Shortness of breath  -CXR showed cardiomegaly and minimal airspace opacity in the retrocardiac left lower lobe  -CT of chest ordered  -oxygen PRN  -continue inhaler for asthma    HTN  -hold losartan due to ALMITA  -monitor    RLS  -continue home meds    [place]  -patient wears hearing aid      DVT prophylaxis:  Heparin, TEDs/SCDs  Code Status:  Conditional code  Admission Status: Patient will be admitted to (LEXOBS or LEXINPT)     ZENA Mccray 04/04/17 4:31 PM      Brief Attending Note       I have seen and examined the patient, performing an independent face-to-face diagnostic evaluation with plan of care reviewed and developed with the advanced practice clinician ZENA Elder.      Brief Summary Statement/HPI:   Ms. Duarte is an 89 year old  woman who presents to [facility] with confusion and falls/weakness.  She tells me that her legs just seem to give out.  She last fell 2 days ago.  When home health came today, they found her lethargic and not following commands.  She speaks to me fluently this evening, though she is quite [place].  Her CT and MRI of the head have been negative for acute processes.  She has had some  chills but no documented fevers.  She has had some cough and tells me she has had PNA 3 times in the past.  She denies chest pain but has been more SOA than usual.  She denies abdominal pain, N/V/D.  She was incontinent of urine in the ambulance on the way to the hospital.  She has not had increased LE edema.      Attending Physical Exam:  Temp:  [97.6 °F (36.4 °C)-98.6 °F (37 °C)] 97.6 °F (36.4 °C)  Heart Rate:  [61-73] 66  Resp:  [18-20] 18  BP: (142-179)/(50-76) 162/59     Constitutional: no acute distress, awake, alert, [place]. I have to speak right next to her ear.  Eyes: PERRLA, sclerae anicteric, no conjunctival injection  Neck: supple, no thyromegaly, trachea midline  Respiratory: Rales posterior RLL, nonlabored respirations   Cardiovascular: RRR,  palpable pedal pulses bilaterally  Gastrointestinal: Positive bowel sounds, soft, nontender, nondistended  Musculoskeletal: No bilateral ankle edema, no clubbing or cyanosis to bilateral lower extremities  Psychiatric: oriented x 3, appropriate affect, cooperative  Neurologic: Strength symmetric in all extremities, Cranial Nerves grossly intact to confrontation.  Slow but accurate movements.        Brief Assessment/Plan :  CT shows that retrocardiac opacity is just her hiatal hernia, however there is   a crescentic consolidative small airspace opacity right base with trace  effusion.    CAP: Rocephin and doxy.  --O2  --Duoneb  --Follow blood cultures    Altered mental status:  --Improved  --MRI and CT negative for acute process  --Neuro consult has been placed  --history of PD    ALMITA:  --Gentle fluids  --Recheck in AM      See above for further detailed assessment and plan developed with APC which I have reviewed and/or edited.    I believe this patient meets  LEXINPT: Patient with CAP and recent MS changes as well as associated ALMITA, weakness and falls.  Requiring IV antibiotics as well as supportive care.     Aileen Lau MD  04/04/17  9:27 PM

## 2017-04-04 NOTE — ED PROVIDER NOTES
Subjective   HPI Comments: Meche Duarte is a 89 y.o.female who presents to the ED with altered mental status and fatigue. Per pt's home health nurse, pt is normally conversant, but today she has been less responsive and lethargic. Pt states she is fatigued and EMS note she had one instance of urinary incontinence. Upon examination here in the ED, pt reports some nausea, but denies any diarrhea, abd pain, HA or CP.    Her PCP is Dr. Rigoberto Hodge.    PMHx of CHF, HTN and asthma.          Patient is a 89 y.o. female presenting with altered mental status.   History provided by:  EMS personnel, patient and caregiver  History limited by:  Mental status change  Altered Mental Status   Presenting symptoms: behavior changes and lethargy    Severity:  Moderate  Most recent episode:  Today  Timing:  Constant  Chronicity:  New  Context: not homeless and not nursing home resident    Associated symptoms: bladder incontinence    Associated symptoms: no abdominal pain and no headaches        Review of Systems   Unable to perform ROS: Mental status change   Cardiovascular: Negative for chest pain.   Gastrointestinal: Negative for abdominal pain and diarrhea.   Genitourinary: Positive for bladder incontinence.   Neurological: Negative for headaches.       Past Medical History:   Diagnosis Date   • Asthma    • CHF (congestive heart failure)    • GERD (gastroesophageal reflux disease)    • [place] (hard of hearing)    • Hypertension        Allergies   Allergen Reactions   • Asa [Aspirin]        Past Surgical History:   Procedure Laterality Date   • JOINT REPLACEMENT         History reviewed. No pertinent family history.    Social History     Social History   • Marital status:      Spouse name: N/A   • Number of children: N/A   • Years of education: N/A     Social History Main Topics   • Smoking status: Never Smoker   • Smokeless tobacco: None   • Alcohol use No   • Drug use: None   • Sexual activity: Not Asked     Other Topics Concern  "  • None     Social History Narrative   • None         Objective   Physical Exam   Constitutional: She appears well-developed and well-nourished. She appears lethargic.   HENT:   Head: Normocephalic and atraumatic.   Right Ear: External ear normal.   Left Ear: External ear normal.   Nose: Nose normal.   Mouth/Throat: Oropharynx is clear and moist.   Eyes:   Right corneal clouding. Right pupil is minimally reactive.     Neck: Normal range of motion. Neck supple.   Cardiovascular: Normal rate and regular rhythm.  Exam reveals no gallop and no friction rub.    Murmur heard.   Systolic murmur is present with a grade of 3/6   Pulmonary/Chest: Effort normal and breath sounds normal. No respiratory distress. She has no wheezes. She has no rales.   Abdominal: Soft. Bowel sounds are normal. She exhibits no distension. There is no tenderness.   Musculoskeletal: Normal range of motion.   Neurological: She appears lethargic.   Her speech is slurred and she cannot answer simple questions. Slowed mentation.   Skin: Skin is warm and dry.   Nursing note and vitals reviewed.      Procedures         ED Course  ED Course   Comment By Time   Patient's daughter arrived with a history of slow decline in her general vigor over the last 2-3 days, and more \"foggy headed\" today.  She has been seen by Dr. Harris in the past for Parkinson's like syndrome and restless leg syndrome.  She's had more difficulty with placing her feet and getting her feet said over the last several weeks but the significant progressive increase in fatigue has been a last 2-3 days acutely.Discussed evaluation to date and continued evaluation in the ED.  Patient and daughter agree Ariel London MD 04/04 8207   Patient has serially reevaluated with last reevaluation now.  She is much more alert.  She remains confused and does not know what year it is.  She is able to push and pull against my hands on examination she can accomplish finger to nose but this is very " slow, very difficult with pass pointing initially and then pulling back left greater than right.  The remainder examination is nonfocal saving changes due to clouding of her right cornea.  She has received half a liter IV with improvement of her symptoms.  There is no clear definite etiology to date, though Dr. Du believes there may be a left retrocardiac infiltrate on chest x-ray.The patient is certainly change from her baseline.  She does not have a UTI.  Blood cultures are obtained.  Will treated with IV antibiotics.  I will add an MRI of the brain, though the patient is not an intervention candidate all because of the long duration of her symptoms even before she to have a lesion on MR, as this would be 48+ hours old. Ariel London MD 04/04 7076   I discussed the pt's case in detail with Dr. Verdin, hospitalist, who recommends holding her levaquin and getting a CT chest for evaluation of her retrocardiac infiltrate.  [facility] Abdirizak Chatterjee 04/04 142     Recent Results (from the past 24 hour(s))   Urinalysis With / Culture If Indicated    Collection Time: 04/04/17 11:58 AM   Result Value Ref Range    Color, UA Yellow Yellow, Straw    Appearance, UA Clear Clear    pH, UA 6.5 5.0 - 8.0    Specific Gravity, UA 1.010 1.001 - 1.030    Glucose, UA Negative Negative    Ketones, UA Negative Negative    Bilirubin, UA Negative Negative    Blood, UA Negative Negative    Protein, UA Negative Negative    Leuk Esterase, UA Negative Negative    Nitrite, UA Negative Negative    Urobilinogen, UA 1.0 E.U./dL 0.2 - 1.0 E.U./dL   Comprehensive Metabolic Panel    Collection Time: 04/04/17 12:42 PM   Result Value Ref Range    Glucose 84 70 - 100 mg/dL    BUN 19 9 - 23 mg/dL    Creatinine 1.40 (H) 0.60 - 1.30 mg/dL    Sodium 141 132 - 146 mmol/L    Potassium 4.8 3.5 - 5.5 mmol/L    Chloride 105 99 - 109 mmol/L    CO2 26.0 20.0 - 31.0 mmol/L    Calcium 9.3 8.7 - 10.4 mg/dL    Total Protein 6.6 5.7 - 8.2 g/dL    Albumin  3.70 3.20 - 4.80 g/dL    ALT (SGPT) 3 (L) 7 - 40 U/L    AST (SGOT) 19 0 - 33 U/L    Alkaline Phosphatase 79 25 - 100 U/L    Total Bilirubin 0.3 0.3 - 1.2 mg/dL    eGFR Non African Amer 35 (L) >60 mL/min/1.73    Globulin 2.9 gm/dL    A/G Ratio 1.3 (L) 1.5 - 2.5 g/dL    BUN/Creatinine Ratio 13.6 7.0 - 25.0    Anion Gap 10.0 3.0 - 11.0 mmol/L   Protime-INR    Collection Time: 04/04/17 12:42 PM   Result Value Ref Range    Protime 10.9 9.6 - 11.5 Seconds    INR 1.00    Lipase    Collection Time: 04/04/17 12:42 PM   Result Value Ref Range    Lipase 50 6 - 51 U/L   BNP    Collection Time: 04/04/17 12:42 PM   Result Value Ref Range    .0 (H) 0.0 - 100.0 pg/mL   Lactic Acid, Plasma    Collection Time: 04/04/17 12:42 PM   Result Value Ref Range    Lactate 0.6 0.5 - 2.0 mmol/L   CBC Auto Differential    Collection Time: 04/04/17 12:42 PM   Result Value Ref Range    WBC 5.53 3.50 - 10.80 10*3/mm3    RBC 3.56 (L) 3.89 - 5.14 10*6/mm3    Hemoglobin 10.9 (L) 11.5 - 15.5 g/dL    Hematocrit 34.8 34.5 - 44.0 %    MCV 97.8 80.0 - 99.0 fL    MCH 30.6 27.0 - 31.0 pg    MCHC 31.3 (L) 32.0 - 36.0 g/dL    RDW 13.2 11.3 - 14.5 %    RDW-SD 47.2 37.0 - 54.0 fl    MPV 10.0 6.0 - 12.0 fL    Platelets 276 150 - 450 10*3/mm3    Neutrophil % 61.5 41.0 - 71.0 %    Lymphocyte % 28.2 24.0 - 44.0 %    Monocyte % 7.6 0.0 - 12.0 %    Eosinophil % 1.8 0.0 - 3.0 %    Basophil % 0.5 0.0 - 1.0 %    Immature Grans % 0.4 0.0 - 0.6 %    Neutrophils, Absolute 3.40 1.50 - 8.30 10*3/mm3    Lymphocytes, Absolute 1.56 0.60 - 4.80 10*3/mm3    Monocytes, Absolute 0.42 0.00 - 1.00 10*3/mm3    Eosinophils, Absolute 0.10 0.10 - 0.30 10*3/mm3    Basophils, Absolute 0.03 0.00 - 0.20 10*3/mm3    Immature Grans, Absolute 0.02 0.00 - 0.03 10*3/mm3   POC Troponin, Rapid    Collection Time: 04/04/17 12:43 PM   Result Value Ref Range    Troponin I 0.00 0.00 - 0.07 ng/mL     Note: In addition to lab results from this visit, the labs listed above may include labs taken at  another facility or during a different encounter within the last 24 hours. Please correlate lab times with ED admission and discharge times for further clarification of the services performed during this visit.    XR Chest 1 View    (Results Pending)   CT Head Without Contrast    (Results Pending)   MRI Brain Without Contrast    (Results Pending)   CT Chest Without Contrast    (Results Pending)     Vitals:    04/04/17 1245 04/04/17 1300 04/04/17 1354 04/04/17 1356   BP: 150/52 146/59 158/73 156/72   Patient Position:   Lying Sitting   Pulse: 62 65 67 72   Resp:       Temp:       SpO2: 95% 97%     Weight:       Height:         Medications   sodium chloride 0.9 % flush 10 mL (not administered)   sodium chloride 0.9 % infusion (125 mL/hr Intravenous Rate/Dose Change 4/4/17 1324)   sodium chloride 0.9 % bolus 500 mL (0 mL Intravenous Stopped 4/4/17 1323)     ECG/EMG Results (last 24 hours)     ** No results found for the last 24 hours. **                        [facility]  EMR Dragon/Transcription disclaimer:   Much of this encounter note is an electronic transcription/translation of spoken language to printed text. The electronic translation of spoken language may permit erroneous, or at times, nonsensical words or phrases to be inadvertently transcribed; Although I have reviewed the note for such errors, some may still exist.     Final diagnoses:   Weakness   Altered mental status, unspecified altered mental status type   Pneumonia of left lower lobe due to infectious organism       Documentation assistance provided by oziel Chatterjee.  Information recorded by the oziel was done at my direction and has been verified and validated by me.     Abdirizak Chatterjee  04/04/17 1141       Abdirizak Chatterjee  04/04/17 1407       Ariel London MD  04/04/17 0696

## 2017-04-04 NOTE — PROGRESS NOTES
Discharge Planning Assessment  [facility]     Patient Name: Meche Duarte  MRN: [id number]  Today's Date: 4/4/2017    Admit Date: 4/4/2017          Discharge Needs Assessment       04/04/17 1343    Living Environment    Lives With child(paulette), adult    Living Arrangements house    Provides Primary Care For no one, unable/limited ability to care for self    Primary Care Provided By child(paulette) (specify)   Daina    Caregiving Concerns Pt having more falls and difficulty caring for pt at home    Quality Of Family Relationships supportive    Able to Return to Prior Living Arrangements yes    Living Arrangement Comments Family requesting assistance applying for medicaid, CM called [employer] to help with the application process.     Discharge Needs Assessment    Concerns To Be Addressed discharge planning concerns    Concerns Comments CM gave family medicaid nursing home names to reference after pt has obtain medicaid     Community Agency Name(S) [facility]    Equipment Currently Used at Home walker, rolling;wheelchair    Transportation Available family or friend will provide    Discharge Contact Information if Applicable [phone]            Discharge Plan       04/04/17 1348    Case Management/Social Work Plan    Plan Home    Patient/Family In Agreement With Plan yes    Additional Comments Spoke with family at  and explained the process of applying for Medicaid. Called [employer] to start the application process from the ED. Pt. lives with her daughter and has [facility] has her PCP.  She uses a walker or wheel chair depending on the need at the time. Pt. is completely ADL dependent. Medications are covered by insurance and pt will transport home with family. Case Management will follow and assist with any discharge planning needs.        Discharge Placement     No information found                Demographic Summary       04/04/17 1340    Referral Information    Arrived From home or self-care    Reason For Consult  discharge planning    Contact Information    Permission Granted to Share Information With family/designee    Comments Daina Duarte (daughter)     Primary Care Physician Information    Name [facility]            Functional Status       04/04/17 1341    Functional Status Prior    Ambulation 3-->assistive equipment and person    Transferring 3-->assistive equipment and person    Toileting 2-->assistive person    Bathing 2-->assistive person   Sponge bathing    Dressing 2-->assistive person    Eating 0-->independent    Communication 2-->difficulty understanding (not related to language barrier)   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    Swallowing 0-->swallows foods/liquids without difficulty    IADL    Medications completely dependent    Meal Preparation completely dependent    Housekeeping completely dependent    Laundry completely dependent    Shopping completely dependent    Oral Care independent    Activity Tolerance    Current Activity Limitations none    Usual Activity Tolerance poor    Current Activity Tolerance poor    Employment/Financial    Employment/Finance Comments Healthcare  and medication coverage: Medicare and [employer]            Psychosocial     None            Abuse/Neglect     None            Legal     None            Substance Abuse     None            Patient Forms     None          Rossy Powers RN

## 2017-04-05 LAB
ANION GAP SERPL CALCULATED.3IONS-SCNC: 5 MMOL/L (ref 3–11)
BUN BLD-MCNC: 13 MG/DL (ref 9–23)
BUN/CREAT SERPL: 10.8 (ref 7–25)
CALCIUM SPEC-SCNC: 9 MG/DL (ref 8.7–10.4)
CHLORIDE SERPL-SCNC: 112 MMOL/L (ref 99–109)
CO2 SERPL-SCNC: 23 MMOL/L (ref 20–31)
CREAT BLD-MCNC: 1.2 MG/DL (ref 0.6–1.3)
DEPRECATED RDW RBC AUTO: 46.9 FL (ref 37–54)
ERYTHROCYTE [DISTWIDTH] IN BLOOD BY AUTOMATED COUNT: 13.2 % (ref 11.3–14.5)
GFR SERPL CREATININE-BSD FRML MDRD: 42 ML/MIN/1.73
GLUCOSE BLD-MCNC: 65 MG/DL (ref 70–100)
HCT VFR BLD AUTO: 33 % (ref 34.5–44)
HGB BLD-MCNC: 10.6 G/DL (ref 11.5–15.5)
MCH RBC QN AUTO: 31.3 PG (ref 27–31)
MCHC RBC AUTO-ENTMCNC: 32.1 G/DL (ref 32–36)
MCV RBC AUTO: 97.3 FL (ref 80–99)
PLATELET # BLD AUTO: 241 10*3/MM3 (ref 150–450)
PMV BLD AUTO: 10.2 FL (ref 6–12)
POTASSIUM BLD-SCNC: 4.8 MMOL/L (ref 3.5–5.5)
RBC # BLD AUTO: 3.39 10*6/MM3 (ref 3.89–5.14)
SODIUM BLD-SCNC: 140 MMOL/L (ref 132–146)
WBC NRBC COR # BLD: 4.84 10*3/MM3 (ref 3.5–10.8)

## 2017-04-05 PROCEDURE — 97110 THERAPEUTIC EXERCISES: CPT

## 2017-04-05 PROCEDURE — 97530 THERAPEUTIC ACTIVITIES: CPT

## 2017-04-05 PROCEDURE — 94799 UNLISTED PULMONARY SVC/PX: CPT

## 2017-04-05 PROCEDURE — 99233 SBSQ HOSP IP/OBS HIGH 50: CPT | Performed by: HOSPITALIST

## 2017-04-05 PROCEDURE — 94640 AIRWAY INHALATION TREATMENT: CPT

## 2017-04-05 PROCEDURE — 97166 OT EVAL MOD COMPLEX 45 MIN: CPT

## 2017-04-05 PROCEDURE — 80048 BASIC METABOLIC PNL TOTAL CA: CPT | Performed by: NURSE PRACTITIONER

## 2017-04-05 PROCEDURE — 94760 N-INVAS EAR/PLS OXIMETRY 1: CPT

## 2017-04-05 PROCEDURE — 99223 1ST HOSP IP/OBS HIGH 75: CPT | Performed by: PSYCHIATRY & NEUROLOGY

## 2017-04-05 PROCEDURE — 85027 COMPLETE CBC AUTOMATED: CPT | Performed by: NURSE PRACTITIONER

## 2017-04-05 PROCEDURE — 25010000002 HEPARIN (PORCINE) PER 1000 UNITS: Performed by: NURSE PRACTITIONER

## 2017-04-05 PROCEDURE — 97162 PT EVAL MOD COMPLEX 30 MIN: CPT

## 2017-04-05 PROCEDURE — 25010000003 CEFTRIAXONE PER 250 MG: Performed by: FAMILY MEDICINE

## 2017-04-05 RX ORDER — BISACODYL 5 MG/1
10 TABLET, DELAYED RELEASE ORAL DAILY PRN
Status: DISCONTINUED | OUTPATIENT
Start: 2017-04-05 | End: 2017-04-07 | Stop reason: HOSPADM

## 2017-04-05 RX ORDER — OFLOXACIN 3 MG/ML
1 SOLUTION/ DROPS OPHTHALMIC 4 TIMES DAILY
Status: DISCONTINUED | OUTPATIENT
Start: 2017-04-05 | End: 2017-04-07 | Stop reason: HOSPADM

## 2017-04-05 RX ORDER — DOCUSATE SODIUM 100 MG/1
100 CAPSULE, LIQUID FILLED ORAL 2 TIMES DAILY
Status: DISCONTINUED | OUTPATIENT
Start: 2017-04-05 | End: 2017-04-07 | Stop reason: HOSPADM

## 2017-04-05 RX ORDER — POLYETHYLENE GLYCOL 3350 17 G/17G
17 POWDER, FOR SOLUTION ORAL DAILY
Status: DISCONTINUED | OUTPATIENT
Start: 2017-04-05 | End: 2017-04-07 | Stop reason: HOSPADM

## 2017-04-05 RX ADMIN — CARBIDOPA AND LEVODOPA 1 TABLET: 50; 200 TABLET, EXTENDED RELEASE ORAL at 17:47

## 2017-04-05 RX ADMIN — IPRATROPIUM BROMIDE AND ALBUTEROL SULFATE 3 ML: .5; 3 SOLUTION RESPIRATORY (INHALATION) at 19:34

## 2017-04-05 RX ADMIN — HEPARIN SODIUM 5000 UNITS: 5000 INJECTION, SOLUTION INTRAVENOUS; SUBCUTANEOUS at 22:20

## 2017-04-05 RX ADMIN — CARBIDOPA AND LEVODOPA 1 TABLET: 50; 200 TABLET, EXTENDED RELEASE ORAL at 08:21

## 2017-04-05 RX ADMIN — OFLOXACIN 1 DROP: 3 SOLUTION/ DROPS OPHTHALMIC at 17:46

## 2017-04-05 RX ADMIN — OFLOXACIN 1 DROP: 3 SOLUTION/ DROPS OPHTHALMIC at 08:21

## 2017-04-05 RX ADMIN — SODIUM CHLORIDE 75 ML/HR: 9 INJECTION, SOLUTION INTRAVENOUS at 13:52

## 2017-04-05 RX ADMIN — IPRATROPIUM BROMIDE AND ALBUTEROL SULFATE 3 ML: .5; 3 SOLUTION RESPIRATORY (INHALATION) at 15:31

## 2017-04-05 RX ADMIN — BUDESONIDE AND FORMOTEROL FUMARATE DIHYDRATE 2 PUFF: 160; 4.5 AEROSOL RESPIRATORY (INHALATION) at 07:42

## 2017-04-05 RX ADMIN — POLYETHYLENE GLYCOL 3350 17 G: 17 POWDER, FOR SOLUTION ORAL at 12:28

## 2017-04-05 RX ADMIN — PANTOPRAZOLE SODIUM 40 MG: 40 TABLET, DELAYED RELEASE ORAL at 05:18

## 2017-04-05 RX ADMIN — ISOSORBIDE DINITRATE 5 MG: 10 TABLET ORAL at 08:21

## 2017-04-05 RX ADMIN — IPRATROPIUM BROMIDE AND ALBUTEROL SULFATE 3 ML: .5; 3 SOLUTION RESPIRATORY (INHALATION) at 07:42

## 2017-04-05 RX ADMIN — DOCUSATE SODIUM 100 MG: 100 CAPSULE, LIQUID FILLED ORAL at 17:46

## 2017-04-05 RX ADMIN — DOXYCYCLINE 100 MG: 100 INJECTION, POWDER, LYOPHILIZED, FOR SOLUTION INTRAVENOUS at 08:20

## 2017-04-05 RX ADMIN — HEPARIN SODIUM 5000 UNITS: 5000 INJECTION, SOLUTION INTRAVENOUS; SUBCUTANEOUS at 08:21

## 2017-04-05 RX ADMIN — OFLOXACIN 1 DROP: 3 SOLUTION/ DROPS OPHTHALMIC at 22:20

## 2017-04-05 RX ADMIN — DOCUSATE SODIUM 100 MG: 100 CAPSULE, LIQUID FILLED ORAL at 12:29

## 2017-04-05 RX ADMIN — PRAMIPEXOLE DIHYDROCHLORIDE 0.5 MG: 0.25 TABLET ORAL at 22:20

## 2017-04-05 RX ADMIN — CEFTRIAXONE SODIUM 1 G: 1 INJECTION, SOLUTION INTRAVENOUS at 22:20

## 2017-04-05 RX ADMIN — DOXYCYCLINE 100 MG: 100 INJECTION, POWDER, LYOPHILIZED, FOR SOLUTION INTRAVENOUS at 22:23

## 2017-04-05 RX ADMIN — OFLOXACIN 1 DROP: 3 SOLUTION/ DROPS OPHTHALMIC at 12:32

## 2017-04-05 RX ADMIN — BUDESONIDE AND FORMOTEROL FUMARATE DIHYDRATE 2 PUFF: 160; 4.5 AEROSOL RESPIRATORY (INHALATION) at 19:34

## 2017-04-05 NOTE — PROGRESS NOTES
Acute Care - Occupational Therapy Initial Evaluation  [facility]     Patient Name: Meche Duarte  : 1927  MRN: [id number]  Today's Date: 2017  Onset of Illness/Injury or Date of Surgery Date: 17  Date of Referral to OT: 17  Referring Physician: Edmund    Admit Date: 2017       ICD-10-CM ICD-9-CM   1. Weakness R53.1 780.79   2. Altered mental status, unspecified altered mental status type R41.82 780.97   3. Pneumonia of left lower lobe due to infectious organism J18.9 483.8   4. Impaired mobility and ADLs Z74.09 799.89     Patient Active Problem List   Diagnosis   • Weakness   • ALMITA (acute kidney injury)   • HTN (hypertension)   • RLS (restless legs syndrome)   • Altered mental status   • [place] (hard of hearing)   • Asthma   • Shortness of breath   • CAP (community acquired pneumonia)     Past Medical History:   Diagnosis Date   • Asthma    • CHF (congestive heart failure)    • Falls frequently    • GERD (gastroesophageal reflux disease)    • [place] (hard of hearing)    • Hypertension    • Restless leg syndrome    • Shingles     Right Eye     Past Surgical History:   Procedure Laterality Date   • HYSTERECTOMY     • JOINT REPLACEMENT      Bilateral knee replacements   • OTHER SURGICAL HISTORY      pins in Right wrist from fall          OT ASSESSMENT FLOWSHEET (last 72 hours)      OT Evaluation       17 0930 17 0915 17 1612 17 1343 17 1341    Rehab Evaluation    Document Type evaluation  -EH evaluation  -EL       Subjective Information no complaints;agree to therapy  -EH agree to therapy;no complaints  -EL       Patient Effort, Rehab Treatment  good  -EL       Symptoms Noted During/After Treatment other (see comments)  -EH none  -EL       Symptoms Noted Comment Incontinence during sup>sit, Bed>chair  -EH Pt [place] and with fear of falling due to multiple falls at home recently   -EL       General Information    Patient Profile Review yes  -EH yes  -EL       Onset of  "Illness/Injury or Date of Surgery Date 04/04/17  - 04/04/17  -       Referring Physician Wed[place]ico  - ZENA Shaffer  -       General Observations Pt in fowlers, dtr in room. IV intact; Dr.s Duarte and Abilio soon enter  - Pt supine in bed on arrival, SCDs, IV R UE, daughter present   -EL       Pertinent History Of Current Problem ED with AMS with fatiuge noted by HHPT, dtr. Decreased responsiveness, lethargy, urinary incontinence, nausea, 7-8 falls in last 6 months. C/O progressive weakness, subjective parkinson's type symtoms. CXA with L retrocardiac infiltrate. Dtr reports pt with small struggling steps, unable to take side steps.  - Pt with progressive weakness and frequent falls x6 months (reports 7-8 falls). Was receiving  PT, who called EMS 4/4/17 due to pt lethargic and with decreased command following. Urinary incontinence en route to ED. CT head neg. CXR showed retrocardiac infiltrate. h/o \"Parkinson's like syndrome,\" RLS, and pt very [place].  -       Precautions/Limitations fall precautions   exit alarm  - fall precautions  -       Prior Level of Function mod assist:;bed mobility;transfer;gait;dressing  - min assist:;all household mobility;transfer;grooming;mod assist:;dressing;bathing;dependent:;home management;cooking;cleaning;driving;shopping  -EL       Equipment Currently Used at Home walker, rolling  - bath bench;shower chair;walker, rolling;wheelchair   was sponge bathing PTA; no tub transfers  -  walker, rolling;wheelchair  -     Plans/Goals Discussed With patient and family;agreed upon  - patient and family;agreed upon  -       Risks Reviewed patient and family:;LOB;increased discomfort;dizziness;change in vital signs  - patient and family:;increased discomfort  -EL       Benefits Reviewed  patient and family:;improve function;increase independence  -       Barriers to Rehab  previous functional deficit  -EL       Living Environment    Lives With child(paulette), adult  - " child(paulette), adult   daughter   -EL child(paulette), adult  -KZ child(paulette), adult  -ST     Living Arrangements house  -EH house  -EL house  -KZ house  -ST     Home Accessibility tub/shower is not walk in   sponge bathes  -EH tub/shower is not walk in;stairs to enter home;bed and bath on same level  -EL no concerns  -KZ      Number of Stairs to Enter Home  1  -EL       Stair Railings at Home   outside, present at both sides  -KZ      Type of Financial/Environmental Concern   none  -KZ      Transportation Available   family or friend will provide  -KZ family or friend will provide  -ST     Living Environment Comment  Pt's daughter reports functional decline over past 6 mo. She now requires assist for bed mobility, transfers, and was walking short distances to bathroom with  PT.   -EL       Clinical Impression    Date of Referral to OT  04/04/17  -EL       OT Diagnosis  decreased independence with ADLs   -EL       Impairments Found (describe specific impairments)  aerobic capacity/endurance;gait, locomotion, and balance  -EL       Patient/Family Goals Statement  go to [facility] prior to D/C home   -EL       Criteria for Skilled Therapeutic Interventions Met  yes;treatment indicated  -EL       Rehab Potential  good, to achieve stated therapy goals  -EL       Therapy Frequency  daily  -EL       Anticipated Equipment Needs At Discharge  --   TBA further   -EL       Anticipated Discharge Disposition  inpatient rehabilitation facility  -EL       Functional Level Prior    Ambulation   3-->assistive equipment and person   walker  -KZ  3-->assistive equipment and person  -ST    Transferring   3-->assistive equipment and person  -KZ  3-->assistive equipment and person  -ST    Toileting   2-->assistive person  -KZ  2-->assistive person  -ST    Bathing   2-->assistive person  -KZ  2-->assistive person   Sponge bathing  -ST    Dressing   2-->assistive person  -KZ  2-->assistive person  -ST    Eating   0-->independent  -KZ   0-->independent  -ST    Communication   2-->difficulty understanding and speaking (not related to language barrier)   [place], 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  -ST    Swallowing   0-->swallows foods/liquids without difficulty  -KZ  0-->swallows foods/liquids without difficulty  -ST    Vital Signs    Pre Systolic BP Rehab 131  -  -EL       Pre Treatment Diastolic BP 54  -EH 54  -EL       Intra Systolic BP Rehab 129  -  -EL       Intra Treatment Diastolic BP 74  -EH 74  -EL       Post Systolic BP Rehab 179  -  -EL       Post Treatment Diastolic BP 79  -EH 79  -EL       Pretreatment Heart Rate (beats/min) 73  -EH 73  -EL       Intratreatment Heart Rate (beats/min) 122  -  -EL       Posttreatment Heart Rate (beats/min) 97  -EH 97  -EL       Pre SpO2 (%) 95  -EH 95  -EL       O2 Delivery Pre Treatment room air  - room air  -EL       Post SpO2 (%) 94  -EH 94  -EL       O2 Delivery Post Treatment room air  - room air  -EL       Pre Patient Position Supine  -EH Supine  -EL       Intra Patient Position Sitting  -EH Sitting  -EL       Post Patient Position Standing  - Standing  -EL       Pain Assessment    Pain Assessment No/denies pain  - No/denies pain  -EL       Vision Assessment/Intervention    Visual Impairment visual impairment, right   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  - visual impairment, right   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  -EL       Cognitive Assessment/Intervention    Current Cognitive/Communication Assessment functional  - functional  -       Orientation Status oriented x 4  - oriented to;person;place;situation  -       Follows Commands/Answers Questions 100% of the time;able to follow single-step instructions  - able to follow single-step instructions;75% of the time;needs cueing;needs increased time;needs repetition   [place]; hearing aid in R ear   -EL        Personal Safety WNL/WFL  -EH mild impairment;decreased awareness, need for safety  -       Personal Safety Interventions fall prevention program maintained;elopement precautions initiated;gait belt;nonskid shoes/slippers when out of bed;supervised activity  - elopement precautions initiated;fall prevention program maintained;gait belt;nonskid shoes/slippers when out of bed  -EL       ROM (Range of Motion)    General ROM  upper extremity range of motion deficits identified  -       General ROM Detail in supine pt unable to reach neutral DF; in standing pt appears able to flatten foot; remaining WFL.  - B shoulders 50% limited; remaining B UE WFL   -EL       MMT (Manual Muscle Testing)    General MMT Assessment  upper extremity strength deficits identified  -       General MMT Assessment Detail 4/5 hip/ knee extension demo'd; 5/5 great toe extension  - B shoulders 3-/5; remaining B UE 3+/5   -EL       Bed Mobility, Assessment/Treatment    Bed Mobility, Assistive Device bed rails;head of bed elevated;draw sheet  - bed rails;head of bed elevated  -       Bed Mob, Supine to Sit, [place] moderate assist (50% patient effort);2 person assist required  - moderate assist (50% patient effort);2 person assist required;verbal cues required  -       Bed Mob, Sit to Supine, [place] not tested  -EH        Bed Mobility, Comment pt controls posture of trunk, however needs assist to move hips and legs toward EOB  - Pt required min assist to move B LE to EOB; mod assist to lift trunk from HOB; able to scoot hips to EOB with mod assist and VC; required increased time to complete   -EL       Transfer Assessment/Treatment    Transfers, Bed-Chair [place] minimum assist (75% patient effort);2 person assist required  - minimum assist (75% patient effort);2 person assist required;verbal cues required   max VC for sequencing steps   -EL       Transfers, Sit-Stand [place] moderate assist (50%  patient effort);2 person assist required  -EH moderate assist (50% patient effort);2 person assist required;verbal cues required  -EL       Transfers, Stand-Sit [place] 2 person assist required;moderate assist (50% patient effort)  -EH moderate assist (50% patient effort);2 person assist required;verbal cues required  -EL       Transfers, Sit-Stand-Sit, Assist Device rolling walker  -EH rolling walker  -EL       Transfer, Safety Issues balance decreased during turns;sequencing ability decreased  -EH balance decreased during turns;step length decreased;weight-shifting ability decreased  -EL       Transfer, Impairments strength decreased;impaired balance  -EH strength decreased;impaired balance  -EL       Transfer, Comment demo correct HP; pt switched to pull up onto PT instead of one hand on walker. hygiene completed in standing.  - demo correct hand placement without need for cueing; VC for upright posture in standing and to extend hips; pt with BM and urinary incontinence upon standing; completed hygiene standing at EOB   -EL       Functional Mobility    Functional Mobility- Ind. Level  minimum assist (75% patient effort);2 person assist required;verbal cues required  -EL       Functional Mobility- Device  rolling walker  -EL       Functional Mobility-Distance (Feet)  5  -EL       Functional Mobility- Safety Issues  balance decreased during turns;sequencing ability decreased;step length decreased;weight-shifting ability decreased  -EL       Functional Mobility- Comment  pt with forward flexed posture and short shuffling steps despite cueing. max VC for sequencing steps from EOB to chair   -EL       Lower Body Dressing Assessment/Training    LB Dressing Assess/Train, Clothing Type  donning:;slipper socks  -EL       LB Dressing Assess/Train, Position  supine  -EL       LB Dressing Assess/Train, [place]  dependent (less than 25% patient effort)  -EL       LB Dressing Assess/Train, Impairments  ROM  decreased;strength decreased;impaired balance;sensory feedback impaired  -EL       Toileting Assessment/Training    Toileting Assess/Train, Comment  pt with urinary incontinence upon standing at EOB; completed hygiene in standing with min assist x2 to maintain balance    -       Motor Skills/Interventions    Additional Documentation  Balance Skills Training (Group)  -       Balance Skills Training    Sitting-Level of Assistance Contact guard  - Contact guard  -       Sitting-Balance Support  Feet supported;Right upper extremity supported;Left upper extremity supported  -       Standing-Level of Assistance  Minimum assistance;x2  -EL       Static Standing Balance Support Right upper extremity supported;Left upper extremity supported;assistive device  - assistive device  -       Standing-Balance Activities --   posture correction  - Weight Shift A-P;Weight Shift R-L  -EL       Standing Balance # of Minutes 3  -EH        Gait Balance-Level of Assistance Minimum assistance;x2  -EH Minimum assistance;x2  -EL       Gait Balance Support assistive device;Right upper extremity supported;Left upper extremity supported  - assistive device  -       Gait Balance Activities  scanning environment R/L;side-stepping  -       Sensory Assessment/Intervention    Light Touch LLE;RLE  -EH LUE;RUE  -EL       LUE Light Touch  WNL  -EL       RUE Light Touch  WNL  -EL       LLE Light Touch WNL  -EH        RLE Light Touch WNL  -        General Therapy Interventions    Planned Therapy Interventions  activity intolerance;ADL retraining;balance training;bed mobility training;transfer training;strengthening;home exercise program  -       Positioning and Restraints    Pre-Treatment Position in bed  -EH in bed  -EL       Post Treatment Position chair  - chair  -EL       In Chair notified nsg;reclined;encouraged to call for assist;call light within reach;exit alarm on;with family/caregiver  - notified  nsg;reclined;sitting;call light within reach;encouraged to call for assist;exit alarm on;with family/caregiver;legs elevated  -EL         User Key  (r) = Recorded By, (t) = Taken By, (c) = Cosigned By    Initials Name Effective Dates     Kierra Day, PT 06/19/15 -     ST Rossy Powers RN 03/03/16 -     MAI Meneses, RN 06/16/16 -     EL Kamille Amaya OT 06/08/16 -            Occupational Therapy Education     Title: PT OT SLP Therapies (Active)     Topic: Occupational Therapy (Active)     Point: ADL training (Active)    Description: Instruct learner(s) on proper safety adaptation and remediation techniques during self care or transfers.   Instruct in proper use of assistive devices.    Learning Progress Summary    Learner Readiness Method Response Comment Documented by Status   Patient Acceptance E NR Educated on role of OT, benefits of activity, safety for transfers, and proper body mechanics with RW.  04/05/17 1057 Active   Family Acceptance E NR Educated on role of OT, benefits of activity, safety for transfers, and proper body mechanics with RW. EL 04/05/17 1057 Active               Point: Body mechanics (Active)    Description: Instruct learner(s) on proper positioning and spine alignment during self-care, functional mobility activities and/or exercises.    Learning Progress Summary    Learner Readiness Method Response Comment Documented by Status   Patient Acceptance E NR Educated on role of OT, benefits of activity, safety for transfers, and proper body mechanics with RW.  04/05/17 1057 Active   Family Acceptance E NR Educated on role of OT, benefits of activity, safety for transfers, and proper body mechanics with RW.  04/05/17 1057 Active                      User Key     Initials Effective Dates Name Provider Type Discipline    EL 06/08/16 -  Kamille Amaya, OT Occupational Therapist OT                  OT Recommendation and Plan  Anticipated Equipment Needs At Discharge:  (TBA  further )  Anticipated Discharge Disposition: inpatient rehabilitation facility  Planned Therapy Interventions: activity intolerance, ADL retraining, balance training, bed mobility training, transfer training, strengthening, home exercise program  Therapy Frequency: daily  Plan of Care Review  Plan Of Care Reviewed With: patient  Progress: progress toward functional goals as expected  Outcome Summary/Follow up Plan: ELEUTERIO zheng completed this date. Pt presents with impaired strength, balance, and mobility; demo short shuffling steps from bed to chair and requiring cues throughout for sequencing and safety,. Pt would benefit from inpatient rehab stay prior to D/C home due to safety issues.           OT Goals       04/05/17 1058          Bed Mobility OT LTG    Bed Mobility OT LTG, Date Established 04/05/17  -EL      Bed Mobility OT LTG, Time to Achieve by discharge  -EL      Bed Mobility OT LTG, Activity Type supine to sit/sit to supine  -EL      Bed Mobility OT LTG, [place] Level minimum assist (75% patient effort);verbal cues required  -EL      Transfer Training OT LTG    Transfer Training OT LTG, Date Established 04/05/17  -EL      Transfer Training OT LTG, Time to Achieve by discharge  -EL      Transfer Training OT LTG, Activity Type sit to stand/stand to sit;toilet  -EL      Transfer Training OT LTG, [place] Level contact guard assist;verbal cues required  -EL      Transfer Training OT LTG, Assist Device walker, rolling  -EL      Functional Mobility OT LTG    Functional Mobility Goal OT LTG, Date Established 04/05/17  -EL      Functional Mobility Goal OT LTG, Time to Achieve by discharge  -EL      Functional Mobility Goal OT LTG, [place] Level contact guard  -EL      Functional Mobility Goal OT LTG, Assist Device rolling walker  -EL      Functional Mobility Goal OT LTG, Distance to Achieve to the bathroom;to the door  -EL      Activity Tolerance OT LTG    Activity Tolerance Goal OT LTG, Date  Established 04/05/17  -EL      Activity Tolerance Goal OT LTG, Time to Achieve by discharge  -EL      Activity Tolerance Goal OT LTG, Activity Level 15 min activity  -EL      Activity Tolerance Goal OT LTG, Additional Goal with 2 rest breaks   -EL        User Key  (r) = Recorded By, (t) = Taken By, (c) = Cosigned By    Initials Name Provider Type    EL Kamille Amaya, OT Occupational Therapist                Outcome Measures       04/05/17 0930 04/05/17 0915       How much help from another person do you currently need...    Turning from your back to your side while in flat bed without using bedrails? 2  -EH      Moving from lying on back to sitting on the side of a flat bed without bedrails? 2  -EH      Moving to and from a bed to a chair (including a wheelchair)? 2  -EH      Standing up from a chair using your arms (e.g., wheelchair, bedside chair)? 2  -EH      Climbing 3-5 steps with a railing? 1  -EH      To walk in hospital room? 2  -EH      AM-PAC 6 Clicks Score 11  -EH      How much help from another is currently needed...    Putting on and taking off regular lower body clothing?  2  -EL     Bathing (including washing, rinsing, and drying)  2  -EL     Toileting (which includes using toilet bed pan or urinal)  1  -EL     Putting on and taking off regular upper body clothing  2  -EL     Taking care of personal grooming (such as brushing teeth)  2  -EL     Eating meals  3  -EL     Score  12  -EL     Functional Assessment    Outcome Measure Options AM-PAC 6 Clicks Basic Mobility (PT)  -EH AM-PAC 6 Clicks Daily Activity (OT)  -EL       User Key  (r) = Recorded By, (t) = Taken By, (c) = Cosigned By    Initials Name Provider Type     Kierra Day, PT Physical Therapist    EARLENE Amaya, OT Occupational Therapist          Time Calculation:   OT Start Time: 0915    Therapy Charges for Today     Code Description Service Date Service Provider Modifiers Qty    [id number]  OT EVAL MOD COMPLEXITY 4 4/5/2017  Kamille Amaya OT GO 1    [id number]  OT THERAPEUTIC ACT EA 15 MIN 4/5/2017 Kamille Amaya OT GO 1               Kamille Amaya OT  4/5/2017

## 2017-04-05 NOTE — PLAN OF CARE
Problem: Patient Care Overview (Adult)  Goal: Plan of Care Review  Outcome: Ongoing (interventions implemented as appropriate)    04/05/17 1046   Coping/Psychosocial Response Interventions   Plan Of Care Reviewed With patient   Outcome Evaluation   Outcome Summary/Follow up Plan PT IE completed. Pt demonstrates gait ambnormalities causing an impairment of safety 2/2 inability to progress LEs effectively, turn to chair etc. Pt also needs assist for bed mobility and t/f. If able pt would benefit from inpatient rehab stay prior to return home 2/2 safety issues.          Problem: Inpatient Physical Therapy  Goal: Bed Mobility Goal LTG- PT  Outcome: Ongoing (interventions implemented as appropriate)  Goal: Transfer Training Goal 1 LTG- PT  Outcome: Ongoing (interventions implemented as appropriate)    04/05/17 1046   Transfer Training PT LTG   Transfer Training PT LTG, Date Established 04/05/17   Transfer Training PT LTG, Time to Achieve 2 wks   Transfer Training PT LTG, Activity Type sit to stand/stand to sit   Transfer Training PT LTG, Couch Level contact guard assist   Transfer Training PT LTG, Assist Device walker, rolling       Goal: Gait Training Goal LTG- PT  Outcome: Ongoing (interventions implemented as appropriate)    04/05/17 1046   Gait Training PT LTG   Gait Training Goal PT LTG, Date Established 04/05/17   Gait Training Goal PT LTG, Time to Achieve 2 wks   Gait Training Goal PT LTG, Couch Level minimum assist (75% patient effort)   Gait Training Goal PT LTG, Assist Device walker, rolling   Gait Training Goal PT LTG, Distance to Achieve 150       Goal: Stair Training Goal LTG- PT  Outcome: Ongoing (interventions implemented as appropriate)    04/05/17 1046   Stair Training PT LTG   Stair Training Goal PT LTG, Date Established 04/05/17   Stair Training Goal PT LTG, Time to Achieve 2 wks   Stair Training Goal PT LTG, Number of Steps 1   Stair Training Goal PT LTG, Couch Level minimum  assist (75% patient effort)   Stair Training Goal PT LTG, Assist Device walker, rolling

## 2017-04-05 NOTE — CONSULTS
Neurology    Referring provider: Aileen Lau  No referring provider defined for this encounter.    Reason for Consultation: Altered mental state    Chief complaint: Confusion    History of present illness:  Is seen at the request of Dr. morris for evaluation of altered mentation.    She came in yesterday with his confusion.  She was shown to have a creatinine of 1.4.  With hydration K to 1.2 and this morning she is mentally clear.    She has a history of frequent falls which have increased recently.    She apparently has become quite inactive and has not been drinking a great deal fluids because she said fearful of getting up and going to the bathroom.    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  She's been on this for the last 15 years.    She has restless leg syndrome as well.    She has no cognitive problems.  She is hard of hearing.    She lives with her daughter.    Denies lightheadedness on standing. .  She is on long-acting nitroglycerin among other medications.        Review of Systems: All systems reviewed and other than the history of present illness are negative    Home meds:   Prescriptions Prior to Admission   Medication Sig Dispense Refill Last Dose   • albuterol (PROVENTIL HFA;VENTOLIN HFA) 108 (90 BASE) MCG/ACT inhaler Inhale 2 puffs Every 4 (Four) Hours As Needed for Wheezing.      • carbidopa-levodopa CR (SINEMET CR)  MG per CR tablet Take 1 tablet by mouth 2 (Two) Times a Day.      • docusate sodium (COLACE) 100 MG capsule Take 100 mg by mouth Daily As Needed for Constipation.      • fluticasone-salmeterol (ADVAIR) 250-50 MCG/DOSE DISKUS Inhale 2 puffs 2 (Two) Times a Day.      • isosorbide dinitrate (ISORDIL) 5 MG tablet Take 5 mg by mouth 2 (Two) Times a Day.      • losartan (COZAAR) 50 MG tablet Take 50 mg by mouth Daily.      • ofloxacin (OCUFLOX) 0.3 % ophthalmic solution Administer 1 drop into the left eye 4 (Four) Times a Day.      • pantoprazole  "(PROTONIX) 40 MG EC tablet Take 40 mg by mouth Daily.      • pramipexole (MIRAPEX) 0.5 MG tablet Take 0.5 mg by mouth 3 (Three) Times a Day.      • prednisoLONE sodium phosphate (INFLAMASE FORTE) 1 % ophthalmic solution Administer 1 drop to the right eye 2 (Two) Times a Day.      • traMADol (ULTRAM) 50 MG tablet Take 50 mg by mouth Every 6 (Six) Hours As Needed for Moderate Pain (4-6).          History  Past Medical History:   Diagnosis Date   • Asthma    • CHF (congestive heart failure)    • Falls frequently    • GERD (gastroesophageal reflux disease)    • [place] (hard of hearing)    • Hypertension    • Restless leg syndrome    • Shingles     Right Eye   ,   Past Surgical History:   Procedure Laterality Date   • HYSTERECTOMY     • JOINT REPLACEMENT      Bilateral knee replacements   • OTHER SURGICAL HISTORY      pins in Right wrist from fall   , History reviewed. No pertinent family history.,   Social History   Substance Use Topics   • Smoking status: Never Smoker   • Smokeless tobacco: None   • Alcohol use No    and Allergies:  Asa [aspirin],    Vital Signs   Blood pressure 148/67, pulse 53, temperature 98.5 °F (36.9 °C), temperature source Oral, resp. rate 16, height 64\" (162.6 cm), weight 165 lb (74.8 kg), SpO2 100 %.  Body mass index is 28.32 kg/(m^2).    Physical Exam:   General: No acute distress              Neck: No bruits              Cor: Regular rhythm              Extr: 1+ edema              Skin: Warm and dry               Neuro: Alert and oriented to person place and time with normal memory attention concentration.    She has normal fund of knowledge.    Her speech is normal.    Cranial nerves including the optic fundi are normal from two through 12.    Reflexes are 1+ and equal bilaterally.    Motor testing shows normal tone in her upper extremities she had mild increase in tone in her lower extremities.  Deep tendon reflexes are plus minus in her legs.    Sensory testing is normal    Results Review: I " the brain was personally reviewed and shows chronic changes of aging.  No acute changes are identified.    Labs:     Lab Results (last 72 hours)     Procedure Component Value Units Date/Time    Urinalysis With / Culture If Indicated [[id number]]  (Normal) Collected:  04/04/17 1158    Specimen:  Urine from Urine, Catheter Updated:  04/04/17 1214     Color, UA Yellow     Appearance, UA Clear     pH, UA 6.5     Specific Gravity, UA 1.010     Glucose, UA Negative     Ketones, UA Negative     Bilirubin, UA Negative     Blood, UA Negative     Protein, UA Negative     Leuk Esterase, UA Negative     Nitrite, UA Negative     Urobilinogen, UA 1.0 E.U./dL    Narrative:       Urine microscopic not indicated.    CBC & Differential [[id number]] Collected:  04/04/17 1242    Specimen:  Blood Updated:  04/04/17 1255    Narrative:       The following orders were created for panel order CBC & Differential.  Procedure                               Abnormality         Status                     ---------                               -----------         ------                     CBC Auto Differential[[id number]]         Abnormal            Final result                 Please view results for these tests on the individual orders.    CBC Auto Differential [[id number]]  (Abnormal) Collected:  04/04/17 1242    Specimen:  Blood Updated:  04/04/17 1255     WBC 5.53 10*3/mm3      RBC 3.56 (L) 10*6/mm3      Hemoglobin 10.9 (L) g/dL      Hematocrit 34.8 %      MCV 97.8 fL      MCH 30.6 pg      MCHC 31.3 (L) g/dL      RDW 13.2 %      RDW-SD 47.2 fl      MPV 10.0 fL      Platelets 276 10*3/mm3      Neutrophil % 61.5 %      Lymphocyte % 28.2 %      Monocyte % 7.6 %      Eosinophil % 1.8 %      Basophil % 0.5 %      Immature Grans % 0.4 %      Neutrophils, Absolute 3.40 10*3/mm3      Lymphocytes, Absolute 1.56 10*3/mm3      Monocytes, Absolute 0.42 10*3/mm3      Eosinophils, Absolute 0.10 10*3/mm3      Basophils, Absolute 0.03 10*3/mm3      Immature Grans,  Absolute 0.02 10*3/mm3     POC Troponin, Rapid [[id number]]  (Normal) Collected:  04/04/17 1243    Specimen:  Blood Updated:  04/04/17 1300     Troponin I 0.00 ng/mL       Serial Number: [id number]    : [id number]       Comprehensive Metabolic Panel [[id number]]  (Abnormal) Collected:  04/04/17 1242    Specimen:  Blood Updated:  04/04/17 1319     Glucose 84 mg/dL      BUN 19 mg/dL      Creatinine 1.40 (H) mg/dL      Sodium 141 mmol/L      Potassium 4.8 mmol/L      Chloride 105 mmol/L      CO2 26.0 mmol/L      Calcium 9.3 mg/dL      Total Protein 6.6 g/dL      Albumin 3.70 g/dL      ALT (SGPT) 3 (L) U/L      AST (SGOT) 19 U/L      Alkaline Phosphatase 79 U/L      Total Bilirubin 0.3 mg/dL      eGFR Non African Amer 35 (L) mL/min/1.73      Globulin 2.9 gm/dL      A/G Ratio 1.3 (L) g/dL      BUN/Creatinine Ratio 13.6     Anion Gap 10.0 mmol/L     Narrative:       National Kid[employer] Guidelines    Stage                           Description                             GFR                      1                               Normal or High                          90+  2                               Mild decrease                            60-89  3                               Moderate decrease                   30-59  4                               Severe decrease                       15-29  5                               Kidney failure                             <15    Lipase [[id number]]  (Normal) Collected:  04/04/17 1242    Specimen:  Blood Updated:  04/04/17 1319     Lipase 50 U/L     Lactic Acid, Plasma [[id number]]  (Normal) Collected:  04/04/17 1242    Specimen:  Blood Updated:  04/04/17 1319     Lactate 0.6 mmol/L       Falsely depressed results may occur on samples drawn from patients receiving N-Acetylcysteine (NAC) or Metamizole.       Protime-INR [[id number]] Collected:  04/04/17 1242    Specimen:  Blood Updated:  04/04/17 1321     Protime 10.9 Seconds      INR 1.00    Narrative:       Therapeutic  Ranges for INR: 2.0-3.0 (PT 20-30)                              2.5-3.5 (PT 25-34)    BNP [[id number]]  (Abnormal) Collected:  04/04/17 1242    Specimen:  Blood Updated:  04/04/17 1325     .0 (H) pg/mL     Light Blue Top [[id number]] Collected:  04/04/17 1242    Specimen:  Blood Updated:  04/04/17 1701     Extra Tube hold for add-on      Auto resulted       Green Top (Gel) [[id number]] Collected:  04/04/17 1242    Specimen:  Blood Updated:  04/04/17 1701     Extra Tube Hold for add-ons.      Auto resulted.       Lavender Top [[id number]] Collected:  04/04/17 1242    Specimen:  Blood Updated:  04/04/17 1701     Extra Tube hold for add-on      Auto resulted       Gold Top - SST [[id number]] Collected:  04/04/17 1242    Specimen:  Blood Updated:  04/04/17 1701     Extra Tube Hold for add-ons.      Auto resulted.       [place] Draw [[id number]] Collected:  04/04/17 1242    Specimen:  Blood Updated:  04/04/17 1841    Narrative:       The following orders were created for panel order [place] Draw.  Procedure                               Abnormality         Status                     ---------                               -----------         ------                     Light Blue Top[[id number]]                                    Final result               Green Top (Gel)[[id number]]                                   Final result               Lavender Top[[id number]]                                      Final result               Gold Top - SST[[id number]]                                    Final result               Green Top (No Gel)[[id number]]                                                             Please view results for these tests on the individual orders.    Blood Culture [[id number]]  (Normal) Collected:  04/04/17 1215    Specimen:  Blood from Arm, Left Updated:  04/05/17 0101     Blood Culture No growth at less than 24 hours    Blood Culture [[id number]]  (Normal) Collected:  04/04/17 1240    Specimen:  Blood from Hand, Left  Updated:  04/05/17 0101     Blood Culture No growth at less than 24 hours    CBC (No Diff) [[id number]]  (Abnormal) Collected:  04/05/17 0616    Specimen:  Blood Updated:  04/05/17 0711     WBC 4.84 10*3/mm3      RBC 3.39 (L) 10*6/mm3      Hemoglobin 10.6 (L) g/dL      Hematocrit 33.0 (L) %      MCV 97.3 fL      MCH 31.3 (H) pg      MCHC 32.1 g/dL      RDW 13.2 %      RDW-SD 46.9 fl      MPV 10.2 fL      Platelets 241 10*3/mm3     Urine Culture [[id number]]  (Normal) Collected:  04/04/17 1159    Specimen:  Urine from Urine, Clean Catch Updated:  04/05/17 0730     Urine Culture No growth    Basic Metabolic Panel [[id number]]  (Abnormal) Collected:  04/05/17 0616    Specimen:  Blood Updated:  04/05/17 0814     Glucose 65 (L) mg/dL      BUN 13 mg/dL      Creatinine 1.20 mg/dL      Sodium 140 mmol/L      Potassium 4.8 mmol/L      Chloride 112 (H) mmol/L      CO2 23.0 mmol/L      Calcium 9.0 mg/dL      eGFR Non African Amer 42 (L) mL/min/1.73      BUN/Creatinine Ratio 10.8     Anion Gap 5.0 mmol/L     Narrative:       National Kidney Foundation Guidelines    Stage                           Description                             GFR                      1                               Normal or High                          90+  2                               Mild decrease                            60-89  3                               Moderate decrease                   30-59  4                               Severe decrease                       15-29  5                               Kidney failure                             <15          Rads:  Imaging Results (last 72 hours)     Procedure Component Value Units Date/Time    CT Chest Without Contrast [[id number]] Collected:  04/04/17 1542     Updated:  04/04/17 1601    Narrative:       EXAMINATION: CT CHEST WO CONTRAST- 04/04/2017     INDICATION: R53.1-Weakness; R41.82-Altered mental status, unspecified;  J18.9-Pneumonia, unspecified organism         TECHNIQUE:      CT data  set of the chest and mediastinum was performed without  intravenous contrast enhancement.     The radiation dose reduction device was turned on for each scan per the  ALARA (As Low as Reasonably Achievable) protocol.     COMPARISON: Compared to chest radiographs performed prior to the  examination.     FINDINGS:   1. The suspected retrocardiac density was, indeed, densely calcified  mitral valve annulus. The patient has marked four-chamber cardiomegaly  and there is a mild amount of pericardial thickening.  2. In addition, some of the retrocardiac density is produced by a large  hiatus hernia with partial intrathoracic stomach.  3. There is a small airspace opacity with minimal crescentic  opacification and consolidation at the right lung base with a trace  effusion.  4. There are multiple hepatic cysts seen in all segments of the liver.  The largest of the hepatic cysts is in the right lower lobe and measures  6.6 cm. Please note that some large liver cysts can have a slight  increased risk of malignant degeneration. A solid liver mass is not  currently identified otherwise.  5. There is marked degenerative disease and arthropathy of the right  shoulder with bone on bone and there is a large right shoulder effusion.  Please note this finding.  6. The adrenal glands remainder of the upper abdominal contents are  unremarkable. The extended window datasets demonstrate significant  centrilobular emphysema and obstructive lung disease with mild  interstitial fibrosis.       Impression:       1. The apparent lower lung or retrocardiac density is produced by a  large hiatus hernia with partial intrathoracic stomach in combination  with mitral annulus calcification which is exceedingly dense.   2. The patient has significant cardiomegaly with thickening of the  pericardium. Marked liver cysts are identified throughout all areas of  the liver as described and measured above. There is bone on bone with  right shoulder  arthropathy and a large right shoulder effusion. There is  a crescentic consolidative small airspace opacity right base with trace  effusion. There is marked obstructive and fibrotic lung disease with  moderate centrilobular emphysema throughout both lungs. Central  mediastinal bulky adenopathy is otherwise not identified. Nonetheless,  please note the multiple abnormal findings     D:  04/04/2017  E:  04/04/2017        This report was finalized on 4/4/2017 3:59 PM by Dr. Jerry Du MD.       XR Chest 1 View [[id number]] Collected:  04/04/17 1623     Updated:  04/04/17 1631    Narrative:       EXAMINATION: XR CHEST 1 VIEW-04/04/2017:      INDICATION: Aspiration.      COMPARISON: NONE.     FINDINGS:   1. Cardiomegaly is noted. Some mild nodular densities are seen in the  retrocardiac region of the left lower lung. Some of this could be mitral  annulus calcification but this is somewhat more lateral than one would  expect.      2. Significant or extensive other airway disease, consolidation or free  fluid is not identified. The mid and upper lung zones are clear.       Impression:       1.  There is minimal airspace opacity in the retrocardiac left lower  lobe. Activity and acute status is indeterminate. It appears to be new  from 01/17/2017.  2.  Otherwise, there is no other acute or active disease elsewhere.     D:  04/04/2017  E:  04/04/2017     This report was finalized on 4/4/2017 4:29 PM by Dr. Jerry Du MD.       MRI Brain Without Contrast [[id number]]  (Abnormal) Collected:  04/04/17 1408     Updated:  04/04/17 1855    Narrative:         EXAM:    MR Head Without Intravenous Contrast    CLINICAL HISTORY:    89 years old, female; Pneumonia, unspecified organism; Altered mental status,   unspecified; Weakness; Signs and symptoms; Altered mental status/memory loss   and malaise or fatigue and weakness, extremity; Bilateral; Confusion or   disorientation; Patient HX: Altered mental status and confusion,  patient   becoming more lethargic per family, general weakness; Additional info: Ms   changes    TECHNIQUE:    Magnetic resonance images of the head/brain without intravenous contrast in   multiple planes.    EXAM DATE/TIME:    4/4/2017 2:08 PM    COMPARISON:    MR BRAIN WO CONTRAST 6/2/2015 12:13:11 PM    FINDINGS:    Brain:  Moderate atrophy. Moderate periventricular white matter hyperintense   signal with moderate number of scattered subcortical white matter   hyperintensities. Mild patchy increased signal in the urmila. Mild patchy   hyperintense signal in the basal ganglia.  No hemorrhage.  No acute infarct.    Ventricles: Prominence of the CSF spaces.  No obstructive ventriculomegaly.    Bones/joints:  Unremarkable.    Sinuses:  Unremarkable as visualized.  No acute sinusitis.    Mastoid air cells:  Unremarkable as visualized.  No mastoid effusion.    Orbits:  Unremarkable as visualized.      Impression:       Moderate atrophy with moderate chronic ischemic small vessel white   matter disease as the most likely diagnosis. Increased white matter disease   compared to previous exam 2015.    No acute infarct, hemorrhage or mass.    THIS DOCUMENT HAS BEEN ELECTRONICALLY SIGNED BY AMY ZAVALA MD    CT Head Without Contrast [[id number]] Collected:  04/05/17 0912     Updated:  04/05/17 0912    Narrative:       EXAMINATION: CT HEAD WITHOUT CONTRAST-04/04/2017:      INDICATION: MS changes, altered mental status.     TECHNIQUE:  CT data set of the brain was performed without intravenous  contrast.     COMPARISON: Compared to previous and most recent CT data sets of the  brain of 01/17/2017.     FINDINGS:   1. The foramen magnum is clear. Basal cisterns are negative for  subarachnoid bleed.  2. Senile atrophy is seen peripherally. There is microangiopathy in the  white matter with degenerative calcifications diffusely.  3. Superimposed infarct, hemorrhage or edema is not identified. There is  no cerebral contusion or  midline shift. There is no evidence of  extracerebral collection.  4. There is mastoid fluid on the left. There is no evidence of skull  fracture.       Impression:       1.  Senile changes within the brain with atrophy and microangiopathy.  2.  No evidence of cerebral contusion, edema or infarct. There is no  extracerebral collection. There is mastoid fluid.  3.  Mucosal edema is seen in the inferior maxillary sinuses without air  fluid level.     D:  04/04/2017  E:  04/05/2017                  Assessment:  Mental state resolved likely secondary to dehydration    Falls likely secondary to orthostatic hypotension and physical inactivity     Plan:    Your should be discontinued.    She should have orthostatic blood pressure checks    She would benefit from physical therapy and the would to likely need that after discharge.  When she goes to [facility] or has home health the is  Up for discussion.    Comment:   Remarkably healthy physically inactive elderly woman who would to well to be more active    I discussed the patients findings and my recommendations with patient, family, nursing staff and primary care team      Elian Knox MD  04/05/17  9:51 AM

## 2017-04-05 NOTE — PROGRESS NOTES
Continued Stay Note  [facility]     Patient Name: Meche Duarte  MRN: [id number]  Today's Date: 4/5/2017    Admit Date: 4/4/2017          Discharge Plan       04/05/17 0856    Case Management/Social Work Plan    Plan [facility]    Patient/Family In Agreement With Plan yes    Additional Comments Spoke with daughter at bedside.  She is interested in rehab at [facility] at discharge.  Will need PT/OT recs.  CM will send referral to [facility] once PT/OT eval.  Daughter can transport pt at discharge.  CM will continue to follow for discharge needs.               Discharge Codes     None            Bobbi Woods RN

## 2017-04-05 NOTE — PLAN OF CARE
Problem: Skin Integrity Impairment, Risk/Actual (Adult)  Goal: Identify Related Risk Factors and Signs and Symptoms  Outcome: Ongoing (interventions implemented as appropriate)    04/04/17 2037   Skin Integrity Impairment, Risk/Actual   Skin Integrity Impairment, Risk/Actual: Related Risk Factors age extremes;immobility;sensory impairment   Signs and Symptoms (Skin Integrity Impairment) edema;plaques/scales

## 2017-04-05 NOTE — PROGRESS NOTES
[facility] Medicine Services  INPATIENT PROGRESS NOTE    Date of Admission: 4/4/2017  Length of Stay: 1  Primary Care Physician: No Known Provider    Subjective   CC:  Falls  HPI:    Awake and alert.  No complaints beyond constipation. Denies chest pain/pleurisy. No other issues.    Review Of Systems:   Review of Systems   Constitutional: Positive for activity change and fatigue.   HENT: Negative.    Respiratory: Negative.    Cardiovascular: Negative.    Gastrointestinal: Positive for constipation.   Genitourinary: Negative.    Neurological: Positive for weakness.         Objective      Temp:  [97.6 °F (36.4 °C)-98.6 °F (37 °C)] 98.5 °F (36.9 °C)  Heart Rate:  [51-73] 53  Resp:  [14-20] 16  BP: (142-179)/(50-76) 148/67  Physical Exam   Constitutional: She is oriented to person, place, and time. She appears well-developed and well-nourished.   HENT:   Head: Normocephalic and atraumatic.   Mouth/Throat: Oropharynx is clear and moist.   Eyes: EOM are normal. Pupils are equal, round, and reactive to light.   Cardiovascular: Normal rate, regular rhythm and normal heart sounds.    Pulmonary/Chest: Effort normal.   Decreased at bases, otherwise clear   Abdominal: Soft. Bowel sounds are normal.   Musculoskeletal: Normal range of motion.   Neurological: She is alert and oriented to person, place, and time.   Skin: Skin is warm.   Psychiatric: She has a normal mood and affect.   Vitals reviewed.        Results Review:    I have reviewed the labs, radiology results and diagnostic studies.      Results from last 7 days  Lab Units 04/05/17  0616   WBC 10*3/mm3 4.84   HEMOGLOBIN g/dL 10.6*   PLATELETS 10*3/mm3 241       Results from last 7 days  Lab Units 04/05/17  0616   SODIUM mmol/L 140   POTASSIUM mmol/L 4.8   CHLORIDE mmol/L 112*   TOTAL CO2 mmol/L 23.0   BUN mg/dL 13   CREATININE mg/dL 1.20   GLUCOSE mg/dL 65*   CALCIUM mg/dL 9.0       Culture Data: Cultures:    Blood Culture   Date Value Ref  Range Status   04/04/2017 No growth at less than 24 hours  Preliminary   04/04/2017 No growth at less than 24 hours  Preliminary     Urine Culture   Date Value Ref Range Status   04/04/2017 No growth  Preliminary       Radiology Data:     I have reviewed the medications.    Assessment/Plan     Problem List  Principal Problem:    CAP (community acquired pneumonia)  Active Problems:    Weakness    ALMITA (acute kidney injury)    HTN (hypertension)    RLS (restless legs syndrome)    Altered mental status    [place] (hard of hearing)    Asthma    Shortness of breath      CAP  --rocephin/doxy  Falls  --likely a combination of senile gait disorder, possible orthostasis/volume depletion  --hold any nitrates, give IVF, check orthostatics  --PT/OT  ALMITA  --better  HTN  --allow a higher BP  DVT prophylaxis        DVT prophylaxis:  Discharge Planning: I expect patient to be discharged to rehab.    Louie Duarte MD   04/05/17   9:52 AM    Please note that portions of this note may have been completed with a voice recognition program. Efforts were made to edit the dictations, but occasionally words are mistranscribed.

## 2017-04-05 NOTE — PROGRESS NOTES
Continued Stay Note  [facility]     Patient Name: Meche Duarte  MRN: [id number]  Today's Date: 4/5/2017    Admit Date: 4/4/2017          Discharge Plan       04/05/17 1316    Case Management/Social Work Plan    Plan [facility]    Patient/Family In Agreement With Plan yes    Additional Comments Referral called to Shala with [facility].   will continue to follow for discharge needs.               Discharge Codes     None            Bobbi Woods RN

## 2017-04-05 NOTE — PLAN OF CARE
Problem: Fall Risk (Adult)  Goal: Identify Related Risk Factors and Signs and Symptoms  Outcome: Ongoing (interventions implemented as appropriate)    04/04/17 2034   Fall Risk   Fall Risk: Related Risk Factors age-related changes;confusion/agitation;gait/mobility problems;history of falls;homeostatic imbalance;polypharmacy   Fall Risk: Signs and Symptoms presence of risk factors       Goal: Absence of Falls  Outcome: Ongoing (interventions implemented as appropriate)    04/04/17 2034   Fall Risk (Adult)   Absence of Falls making progress toward outcome         Problem: Confusion, Acute (Adult)  Goal: Identify Related Risk Factors and Signs and Symptoms  Outcome: Ongoing (interventions implemented as appropriate)    04/04/17 2034   Confusion, Acute   Related Risk Factors (Acute Confusion) advanced age;polypharmacy   Signs and Symptoms (Acute Confusion) acute onset of symptoms;disorientation       Goal: Cognitive/Functional Impairments Minimized  Outcome: Ongoing (interventions implemented as appropriate)  Goal: Safety  Outcome: Ongoing (interventions implemented as appropriate)    04/04/17 2034   Confusion, Acute (Adult)   Safety making progress toward outcome

## 2017-04-05 NOTE — PLAN OF CARE
Problem: Patient Care Overview (Adult)  Goal: Plan of Care Review  Outcome: Ongoing (interventions implemented as appropriate)    04/05/17 1058   Coping/Psychosocial Response Interventions   Plan Of Care Reviewed With patient   Outcome Evaluation   Outcome Summary/Follow up Plan OT yasminal completed this date. Pt presents with impaired strength, balance, and mobility; demo short shuffling steps from bed to chair and requiring cues throughout for sequencing and safety,. Pt would benefit from inpatient rehab stay prior to D/C home due to safety issues.    Patient Care Overview   Progress progress toward functional goals as expected         Problem: Inpatient Occupational Therapy  Goal: Bed Mobility Goal LTG- OT  Outcome: Ongoing (interventions implemented as appropriate)    04/05/17 1058   Bed Mobility OT LTG   Bed Mobility OT LTG, Date Established 04/05/17   Bed Mobility OT LTG, Time to Achieve by discharge   Bed Mobility OT LTG, Activity Type supine to sit/sit to supine   Bed Mobility OT LTG, [place] Level minimum assist (75% patient effort);verbal cues required       Goal: Transfer Training Goal 1 LTG- OT  Outcome: Ongoing (interventions implemented as appropriate)    04/05/17 1058   Transfer Training OT LTG   Transfer Training OT LTG, Date Established 04/05/17   Transfer Training OT LTG, Time to Achieve by discharge   Transfer Training OT LTG, Activity Type sit to stand/stand to sit;toilet   Transfer Training OT LTG, [place] Level contact guard assist;verbal cues required   Transfer Training OT LTG, Assist Device walker, rolling       Goal: Functional Mobility Goal LTG- OT  Outcome: Ongoing (interventions implemented as appropriate)    04/05/17 1058   Functional Mobility OT LTG   Functional Mobility Goal OT LTG, Date Established 04/05/17   Functional Mobility Goal OT LTG, Time to Achieve by discharge   Functional Mobility Goal OT LTG, [place] Level contact guard   Functional Mobility Goal OT LTG,  Assist Device rolling walker   Functional Mobility Goal OT LTG, Distance to Achieve to the bathroom;to the door       Goal: Activity Tolerance Goal LTG- OT  Outcome: Ongoing (interventions implemented as appropriate)    04/05/17 1058   Activity Tolerance OT LTG   Activity Tolerance Goal OT LTG, Date Established 04/05/17   Activity Tolerance Goal OT LTG, Time to Achieve by discharge   Activity Tolerance Goal OT LTG, Activity Level 15 min activity   Activity Tolerance Goal OT LTG, Additional Goal with 2 rest breaks

## 2017-04-06 LAB
ANION GAP SERPL CALCULATED.3IONS-SCNC: 4 MMOL/L (ref 3–11)
BACTERIA SPEC AEROBE CULT: NORMAL
BASOPHILS # BLD AUTO: 0.02 10*3/MM3 (ref 0–0.2)
BASOPHILS NFR BLD AUTO: 0.4 % (ref 0–1)
BUN BLD-MCNC: 21 MG/DL (ref 9–23)
BUN/CREAT SERPL: 16.2 (ref 7–25)
CALCIUM SPEC-SCNC: 9.1 MG/DL (ref 8.7–10.4)
CHLORIDE SERPL-SCNC: 109 MMOL/L (ref 99–109)
CO2 SERPL-SCNC: 28 MMOL/L (ref 20–31)
CREAT BLD-MCNC: 1.3 MG/DL (ref 0.6–1.3)
DEPRECATED RDW RBC AUTO: 47 FL (ref 37–54)
EOSINOPHIL # BLD AUTO: 0.1 10*3/MM3 (ref 0.1–0.3)
EOSINOPHIL NFR BLD AUTO: 1.9 % (ref 0–3)
ERYTHROCYTE [DISTWIDTH] IN BLOOD BY AUTOMATED COUNT: 13.3 % (ref 11.3–14.5)
GFR SERPL CREATININE-BSD FRML MDRD: 39 ML/MIN/1.73
GLUCOSE BLD-MCNC: 70 MG/DL (ref 70–100)
HCT VFR BLD AUTO: 32.7 % (ref 34.5–44)
HGB BLD-MCNC: 10.4 G/DL (ref 11.5–15.5)
IMM GRANULOCYTES # BLD: 0.01 10*3/MM3 (ref 0–0.03)
IMM GRANULOCYTES NFR BLD: 0.2 % (ref 0–0.6)
LYMPHOCYTES # BLD AUTO: 1.67 10*3/MM3 (ref 0.6–4.8)
LYMPHOCYTES NFR BLD AUTO: 31.1 % (ref 24–44)
MCH RBC QN AUTO: 31 PG (ref 27–31)
MCHC RBC AUTO-ENTMCNC: 31.8 G/DL (ref 32–36)
MCV RBC AUTO: 97.3 FL (ref 80–99)
MONOCYTES # BLD AUTO: 0.57 10*3/MM3 (ref 0–1)
MONOCYTES NFR BLD AUTO: 10.6 % (ref 0–12)
NEUTROPHILS # BLD AUTO: 3 10*3/MM3 (ref 1.5–8.3)
NEUTROPHILS NFR BLD AUTO: 55.8 % (ref 41–71)
PLATELET # BLD AUTO: 240 10*3/MM3 (ref 150–450)
PMV BLD AUTO: 10.3 FL (ref 6–12)
POTASSIUM BLD-SCNC: 4.3 MMOL/L (ref 3.5–5.5)
RBC # BLD AUTO: 3.36 10*6/MM3 (ref 3.89–5.14)
SODIUM BLD-SCNC: 141 MMOL/L (ref 132–146)
WBC NRBC COR # BLD: 5.37 10*3/MM3 (ref 3.5–10.8)

## 2017-04-06 PROCEDURE — 25010000002 HEPARIN (PORCINE) PER 1000 UNITS: Performed by: NURSE PRACTITIONER

## 2017-04-06 PROCEDURE — 94640 AIRWAY INHALATION TREATMENT: CPT

## 2017-04-06 PROCEDURE — 94760 N-INVAS EAR/PLS OXIMETRY 1: CPT

## 2017-04-06 PROCEDURE — 99233 SBSQ HOSP IP/OBS HIGH 50: CPT | Performed by: HOSPITALIST

## 2017-04-06 PROCEDURE — 85025 COMPLETE CBC W/AUTO DIFF WBC: CPT | Performed by: HOSPITALIST

## 2017-04-06 PROCEDURE — 99232 SBSQ HOSP IP/OBS MODERATE 35: CPT | Performed by: PSYCHIATRY & NEUROLOGY

## 2017-04-06 PROCEDURE — 80048 BASIC METABOLIC PNL TOTAL CA: CPT | Performed by: HOSPITALIST

## 2017-04-06 PROCEDURE — 94799 UNLISTED PULMONARY SVC/PX: CPT

## 2017-04-06 RX ORDER — SACCHAROMYCES BOULARDII 250 MG
250 CAPSULE ORAL 2 TIMES DAILY
Status: DISCONTINUED | OUTPATIENT
Start: 2017-04-06 | End: 2017-04-07 | Stop reason: HOSPADM

## 2017-04-06 RX ORDER — CEFUROXIME AXETIL 250 MG/1
250 TABLET ORAL EVERY 12 HOURS SCHEDULED
Status: DISCONTINUED | OUTPATIENT
Start: 2017-04-06 | End: 2017-04-07 | Stop reason: HOSPADM

## 2017-04-06 RX ORDER — BENZONATATE 100 MG/1
100 CAPSULE ORAL 3 TIMES DAILY PRN
Status: DISCONTINUED | OUTPATIENT
Start: 2017-04-06 | End: 2017-04-07 | Stop reason: HOSPADM

## 2017-04-06 RX ORDER — DOXYCYCLINE HYCLATE 100 MG/1
100 CAPSULE ORAL EVERY 12 HOURS SCHEDULED
Status: DISCONTINUED | OUTPATIENT
Start: 2017-04-06 | End: 2017-04-07 | Stop reason: HOSPADM

## 2017-04-06 RX ADMIN — HEPARIN SODIUM 5000 UNITS: 5000 INJECTION, SOLUTION INTRAVENOUS; SUBCUTANEOUS at 09:21

## 2017-04-06 RX ADMIN — HEPARIN SODIUM 5000 UNITS: 5000 INJECTION, SOLUTION INTRAVENOUS; SUBCUTANEOUS at 21:30

## 2017-04-06 RX ADMIN — IPRATROPIUM BROMIDE AND ALBUTEROL SULFATE 3 ML: .5; 3 SOLUTION RESPIRATORY (INHALATION) at 16:21

## 2017-04-06 RX ADMIN — CEFUROXIME AXETIL 250 MG: 250 TABLET ORAL at 21:30

## 2017-04-06 RX ADMIN — IPRATROPIUM BROMIDE AND ALBUTEROL SULFATE 3 ML: .5; 3 SOLUTION RESPIRATORY (INHALATION) at 13:08

## 2017-04-06 RX ADMIN — OFLOXACIN 1 DROP: 3 SOLUTION/ DROPS OPHTHALMIC at 09:28

## 2017-04-06 RX ADMIN — PRAMIPEXOLE DIHYDROCHLORIDE 0.5 MG: 0.25 TABLET ORAL at 21:29

## 2017-04-06 RX ADMIN — BUDESONIDE AND FORMOTEROL FUMARATE DIHYDRATE 2 PUFF: 160; 4.5 AEROSOL RESPIRATORY (INHALATION) at 07:36

## 2017-04-06 RX ADMIN — OFLOXACIN 1 DROP: 3 SOLUTION/ DROPS OPHTHALMIC at 17:51

## 2017-04-06 RX ADMIN — POLYETHYLENE GLYCOL 3350 17 G: 17 POWDER, FOR SOLUTION ORAL at 09:28

## 2017-04-06 RX ADMIN — BUDESONIDE AND FORMOTEROL FUMARATE DIHYDRATE 2 PUFF: 160; 4.5 AEROSOL RESPIRATORY (INHALATION) at 19:26

## 2017-04-06 RX ADMIN — OFLOXACIN 1 DROP: 3 SOLUTION/ DROPS OPHTHALMIC at 21:32

## 2017-04-06 RX ADMIN — CEFUROXIME AXETIL 250 MG: 250 TABLET ORAL at 09:22

## 2017-04-06 RX ADMIN — ACETAMINOPHEN 650 MG: 325 TABLET, FILM COATED ORAL at 09:23

## 2017-04-06 RX ADMIN — Medication 250 MG: at 17:52

## 2017-04-06 RX ADMIN — PANTOPRAZOLE SODIUM 40 MG: 40 TABLET, DELAYED RELEASE ORAL at 05:36

## 2017-04-06 RX ADMIN — DOCUSATE SODIUM 100 MG: 100 CAPSULE, LIQUID FILLED ORAL at 17:51

## 2017-04-06 RX ADMIN — CARBIDOPA AND LEVODOPA 1 TABLET: 50; 200 TABLET, EXTENDED RELEASE ORAL at 17:51

## 2017-04-06 RX ADMIN — IPRATROPIUM BROMIDE AND ALBUTEROL SULFATE 3 ML: .5; 3 SOLUTION RESPIRATORY (INHALATION) at 07:36

## 2017-04-06 RX ADMIN — DOXYCYCLINE HYCLATE 100 MG: 100 CAPSULE ORAL at 09:23

## 2017-04-06 RX ADMIN — CARBIDOPA AND LEVODOPA 1 TABLET: 50; 200 TABLET, EXTENDED RELEASE ORAL at 09:22

## 2017-04-06 RX ADMIN — IPRATROPIUM BROMIDE AND ALBUTEROL SULFATE 3 ML: .5; 3 SOLUTION RESPIRATORY (INHALATION) at 19:26

## 2017-04-06 RX ADMIN — DOXYCYCLINE HYCLATE 100 MG: 100 CAPSULE ORAL at 21:30

## 2017-04-06 RX ADMIN — DOCUSATE SODIUM 100 MG: 100 CAPSULE, LIQUID FILLED ORAL at 09:22

## 2017-04-06 RX ADMIN — OFLOXACIN 1 DROP: 3 SOLUTION/ DROPS OPHTHALMIC at 13:00

## 2017-04-06 RX ADMIN — Medication 250 MG: at 09:23

## 2017-04-06 NOTE — PROGRESS NOTES
Neurology       Patient Care Team:  No Known Provider as PCP - General    Chief complaint: Falls    History:  Patient feels better the with hydration and discontinuation of the long-acting nitrate.    He requires assistance of two for both walking and to bed mobility.      Past Medical History:   Diagnosis Date   • Asthma    • CHF (congestive heart failure)    • Falls frequently    • GERD (gastroesophageal reflux disease)    • [place] (hard of hearing)    • Hypertension    • Restless leg syndrome    • Shingles     Right Eye       Vital Signs   Vitals:    04/06/17 0907 04/06/17 0909 04/06/17 0912 04/06/17 0927   BP: 147/56 176/76 (!) 163/115 143/67   BP Location: Left arm Left arm Left arm Left arm   Patient Position: Lying Sitting Standing Lying   Pulse: 85 106 118 86   Resp: 18      Temp:       TempSrc:       SpO2: 98% 98% 96% 96%   Weight:       Height:           Physical Exam:   General: Alert and oriented              Neuro: Gait is severely apraxic.    Lower extremities are quite weak.    Mentally she is alert and oriented.    Speech is normal.        Results Review:  No orthostatic blood pressure drops    Lab is reviewed    Results from last 7 days  Lab Units 04/06/17  0532   WBC 10*3/mm3 5.37   HEMOGLOBIN g/dL 10.4*   HEMATOCRIT % 32.7*   PLATELETS 10*3/mm3 240       Results from last 7 days  Lab Units 04/06/17  0532 04/05/17  0616 04/04/17  1242   SODIUM mmol/L 141 140 141   POTASSIUM mmol/L 4.3 4.8 4.8   CHLORIDE mmol/L 109 112* 105   TOTAL CO2 mmol/L 28.0 23.0 26.0   BUN mg/dL 21 13 19   CREATININE mg/dL 1.30 1.20 1.40*   CALCIUM mg/dL 9.1 9.0 9.3   BILIRUBIN mg/dL  --   --  0.3   ALK PHOS U/L  --   --  79   ALT (SGPT) U/L  --   --  3*   AST (SGOT) U/L  --   --  19   GLUCOSE mg/dL 70 65* 84     Imaging Results (last 24 hours)     Procedure Component Value Units Date/Time    CT Head Without Contrast [[id number]] Collected:  04/05/17 0912     Updated:  04/05/17 1200    Narrative:       EXAMINATION: CT HEAD WITHOUT  CONTRAST-04/04/2017:      INDICATION: MS changes, altered mental status.     TECHNIQUE:  CT data set of the brain was performed without intravenous  contrast.     COMPARISON: Compared to previous and most recent CT data sets of the  brain of 01/17/2017.     FINDINGS:   1. The foramen magnum is clear. Basal cisterns are negative for  subarachnoid bleed.  2. Senile atrophy is seen peripherally. There is microangiopathy in the  white matter with degenerative calcifications diffusely.  3. Superimposed infarct, hemorrhage or edema is not identified. There is  no cerebral contusion or midline shift. There is no evidence of  extracerebral collection.  4. There is mastoid fluid on the left. There is no evidence of skull  fracture.       Impression:       1.  Senile changes within the brain with atrophy and microangiopathy.  2.  No evidence of cerebral contusion, edema or infarct. There is no  extracerebral collection. There is mastoid fluid.  3.  Mucosal edema is seen in the inferior maxillary sinuses without air  fluid level.     D:  04/04/2017  E:  04/05/2017     This report was finalized on 4/5/2017 11:57 AM by Dr. Jerry Du MD.             Assessment:  Fall secondary to chronic debility    Senile gait disorder    Plan:  Patient would benefit from inpatient rehabilitation.    She is enthusiastic about pursuing aggressive therapy at [facility]    Comment:           I discussed the patients findings and my recommendations with patient and family    Elian Knox MD  04/06/17  11:24 AM

## 2017-04-06 NOTE — PROGRESS NOTES
[facility] Medicine Services  INPATIENT PROGRESS NOTE    Date of Admission: 4/4/2017  Length of Stay: 2  Primary Care Physician: No Known Provider    Subjective   CC:  Falls  HPI:    Awake and alert.  No complaints beyond constipation. Dizziness better. No f/c. Notes dry/hacking cough. Denies swallowing difficulty.    Review Of Systems:   Review of Systems   Constitutional: Positive for activity change and fatigue.   HENT: Negative.    Respiratory: Negative.    Cardiovascular: Negative.    Gastrointestinal: Positive for constipation.   Genitourinary: Negative.    Neurological: Positive for weakness.         Objective      Temp:  [97.9 °F (36.6 °C)] 97.9 °F (36.6 °C)  Heart Rate:  [57-89] 57  Resp:  [16-18] 16  BP: (117-202)/(40-98) 156/66  Physical Exam   Constitutional: She is oriented to person, place, and time. She appears well-developed and well-nourished.   HENT:   Head: Normocephalic and atraumatic.   Mouth/Throat: Oropharynx is clear and moist.   Eyes: EOM are normal. Pupils are equal, round, and reactive to light.   Cardiovascular: Normal rate, regular rhythm and normal heart sounds.    Pulmonary/Chest: Effort normal.   Decreased at bases, otherwise clear   Abdominal: Soft. Bowel sounds are normal.   Musculoskeletal: Normal range of motion.   Neurological: She is alert and oriented to person, place, and time.   Skin: Skin is warm.   Psychiatric: She has a normal mood and affect.   Vitals reviewed.        Results Review:    I have reviewed the labs, radiology results and diagnostic studies.      Results from last 7 days  Lab Units 04/06/17  0532   WBC 10*3/mm3 5.37   HEMOGLOBIN g/dL 10.4*   PLATELETS 10*3/mm3 240       Results from last 7 days  Lab Units 04/06/17  0532   SODIUM mmol/L 141   POTASSIUM mmol/L 4.3   CHLORIDE mmol/L 109   TOTAL CO2 mmol/L 28.0   BUN mg/dL 21   CREATININE mg/dL 1.30   GLUCOSE mg/dL 70   CALCIUM mg/dL 9.1       Culture Data: Cultures:    Blood Culture    Date Value Ref Range Status   04/04/2017 No growth at less than 24 hours  Preliminary   04/04/2017 No growth at less than 24 hours  Preliminary     Urine Culture   Date Value Ref Range Status   04/04/2017 No growth  Preliminary       Radiology Data:     I have reviewed the medications.    Assessment/Plan     Problem List  Principal Problem:    CAP (community acquired pneumonia)  Active Problems:    Weakness    ALMITA (acute kidney injury)    HTN (hypertension)    RLS (restless legs syndrome)    Altered mental status    [place] (hard of hearing)    Asthma    Shortness of breath      CAP  --rocephin/doxy, change to po ceftin/doxy  Falls  --likely a combination of senile gait disorder, possible orthostasis/volume depletion  --hold any nitrates, give IVF, checked orthostatics, appears to be consistent with orthostasis  --PT/OT  ALMITA  --better  HTN  --allow a higher BP  DVT prophylaxis        DVT prophylaxis:  Discharge Planning: I expect patient to be discharged to rehab.    Louie Duarte MD   04/06/17   8:13 AM    Please note that portions of this note may have been completed with a voice recognition program. Efforts were made to edit the dictations, but occasionally words are mistranscribed.

## 2017-04-07 VITALS
RESPIRATION RATE: 16 BRPM | TEMPERATURE: 98.1 F | HEIGHT: 64 IN | WEIGHT: 165 LBS | DIASTOLIC BLOOD PRESSURE: 65 MMHG | BODY MASS INDEX: 28.17 KG/M2 | SYSTOLIC BLOOD PRESSURE: 184 MMHG | OXYGEN SATURATION: 95 % | HEART RATE: 74 BPM

## 2017-04-07 PROBLEM — R41.82 ALTERED MENTAL STATUS: Status: RESOLVED | Noted: 2017-04-04 | Resolved: 2017-04-07

## 2017-04-07 PROBLEM — N17.9 AKI (ACUTE KIDNEY INJURY) (HCC): Status: RESOLVED | Noted: 2017-04-04 | Resolved: 2017-04-07

## 2017-04-07 PROBLEM — R06.02 SHORTNESS OF BREATH: Status: RESOLVED | Noted: 2017-04-04 | Resolved: 2017-04-07

## 2017-04-07 PROCEDURE — 25010000002 HEPARIN (PORCINE) PER 1000 UNITS: Performed by: NURSE PRACTITIONER

## 2017-04-07 PROCEDURE — 99231 SBSQ HOSP IP/OBS SF/LOW 25: CPT | Performed by: PSYCHIATRY & NEUROLOGY

## 2017-04-07 PROCEDURE — 94640 AIRWAY INHALATION TREATMENT: CPT

## 2017-04-07 PROCEDURE — 97110 THERAPEUTIC EXERCISES: CPT

## 2017-04-07 PROCEDURE — 99239 HOSP IP/OBS DSCHRG MGMT >30: CPT | Performed by: PHYSICIAN ASSISTANT

## 2017-04-07 PROCEDURE — 94799 UNLISTED PULMONARY SVC/PX: CPT

## 2017-04-07 PROCEDURE — 97116 GAIT TRAINING THERAPY: CPT

## 2017-04-07 RX ORDER — CEFUROXIME AXETIL 250 MG/1
250 TABLET ORAL EVERY 12 HOURS SCHEDULED
Qty: 16 TABLET | Refills: 0
Start: 2017-04-07 | End: 2017-08-15

## 2017-04-07 RX ORDER — BENZONATATE 100 MG/1
100 CAPSULE ORAL 3 TIMES DAILY PRN
Refills: 0
Start: 2017-04-07 | End: 2017-08-15

## 2017-04-07 RX ORDER — CALCIUM CARBONATE 200(500)MG
2 TABLET,CHEWABLE ORAL 2 TIMES DAILY PRN
Start: 2017-04-07 | End: 2017-08-15

## 2017-04-07 RX ORDER — BISACODYL 5 MG/1
10 TABLET, DELAYED RELEASE ORAL DAILY PRN
Refills: 0
Start: 2017-04-07 | End: 2017-08-15

## 2017-04-07 RX ORDER — SACCHAROMYCES BOULARDII 250 MG
250 CAPSULE ORAL 2 TIMES DAILY
Start: 2017-04-07 | End: 2017-04-15

## 2017-04-07 RX ORDER — DOXYCYCLINE HYCLATE 100 MG/1
100 CAPSULE ORAL EVERY 12 HOURS SCHEDULED
Refills: 0
Start: 2017-04-07 | End: 2017-04-15

## 2017-04-07 RX ORDER — PRAMIPEXOLE DIHYDROCHLORIDE 0.5 MG/1
0.5 TABLET ORAL NIGHTLY
Start: 2017-04-07 | End: 2017-08-15

## 2017-04-07 RX ORDER — POLYETHYLENE GLYCOL 3350 17 G/17G
17 POWDER, FOR SOLUTION ORAL DAILY
Start: 2017-04-07

## 2017-04-07 RX ADMIN — Medication 250 MG: at 08:47

## 2017-04-07 RX ADMIN — POLYETHYLENE GLYCOL 3350 17 G: 17 POWDER, FOR SOLUTION ORAL at 08:54

## 2017-04-07 RX ADMIN — IPRATROPIUM BROMIDE AND ALBUTEROL SULFATE 3 ML: .5; 3 SOLUTION RESPIRATORY (INHALATION) at 06:45

## 2017-04-07 RX ADMIN — DOXYCYCLINE HYCLATE 100 MG: 100 CAPSULE ORAL at 08:47

## 2017-04-07 RX ADMIN — Medication 10 ML: at 08:52

## 2017-04-07 RX ADMIN — CARBIDOPA AND LEVODOPA 1 TABLET: 50; 200 TABLET, EXTENDED RELEASE ORAL at 08:47

## 2017-04-07 RX ADMIN — OFLOXACIN 1 DROP: 3 SOLUTION/ DROPS OPHTHALMIC at 12:30

## 2017-04-07 RX ADMIN — BUDESONIDE AND FORMOTEROL FUMARATE DIHYDRATE 2 PUFF: 160; 4.5 AEROSOL RESPIRATORY (INHALATION) at 06:46

## 2017-04-07 RX ADMIN — PANTOPRAZOLE SODIUM 40 MG: 40 TABLET, DELAYED RELEASE ORAL at 05:37

## 2017-04-07 RX ADMIN — DOCUSATE SODIUM 100 MG: 100 CAPSULE, LIQUID FILLED ORAL at 08:47

## 2017-04-07 RX ADMIN — IPRATROPIUM BROMIDE AND ALBUTEROL SULFATE 3 ML: .5; 3 SOLUTION RESPIRATORY (INHALATION) at 12:34

## 2017-04-07 RX ADMIN — HEPARIN SODIUM 5000 UNITS: 5000 INJECTION, SOLUTION INTRAVENOUS; SUBCUTANEOUS at 08:47

## 2017-04-07 RX ADMIN — OFLOXACIN 1 DROP: 3 SOLUTION/ DROPS OPHTHALMIC at 08:48

## 2017-04-07 RX ADMIN — CEFUROXIME AXETIL 250 MG: 250 TABLET ORAL at 08:47

## 2017-04-07 NOTE — DISCHARGE SUMMARY
[facility]  DISCHARGE SUMMARY       Date of Admission: 4/4/2017  Date of Discharge:  4/7/2017  Primary Care Physician: No Known Provider    Discharge Diagnoses:  Active Hospital Problems (** Indicates Principal Problem)    Diagnosis Date Noted   • **CAP (community acquired pneumonia) [J18.9] 04/04/2017   • Weakness [R53.1] 04/04/2017   • HTN (hypertension) [I10] 04/04/2017   • RLS (restless legs syndrome) [G25.81] 04/04/2017   • [place] (hard of hearing) [H91.90] 04/04/2017   • Asthma [J45.909] 04/04/2017      Resolved Hospital Problems    Diagnosis Date Noted Date Resolved   • ALMITA (acute kidney injury) [N17.9] 04/04/2017 04/07/2017   • Altered mental status [R41.82] 04/04/2017 04/07/2017   • Shortness of breath [R06.02] 04/04/2017 04/07/2017       Presenting Problem/History of Present Illness  Weakness [R53.1]  Weakness [R53.1]  CAP (community acquired pneumonia) [J18.9]       History of Present Illness on Day of Discharge:   Patient sitting up in chair, reports doing well, no new complaints. Looking forward to rehab. Had RLS last night, Dr Knox present, encouraged activity during day, continue Mirapex. No fever, chills, SOA, cough, CP, N/V.     Hospital Course  Patient is a 89 y.o. 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  Upon presentation to the ED r4/4/17 patient's daughter reported she has been getting progressively more weak with frequent falls. She fell in 1/2017 requiring an ED visit for sutures to her right elbow. Another fall 1 wk pta w/o incident. [facility] had been  coming to the house for the past 2 weeks due to weakness and when nurse came 4/4/17 patient was lethargic and not following commands. EMS was called and reports patient had one instance of urinary incontinence en route. At time of  arrival to ed pt was alert and orient x 3 and able to answer all questions. CXR in ED showed a left retrocardiac infiltrate. Labs unremarkable except mild ALMITA with creatinine 1.4 and anema with H/H 10.9/34.8 but appears to be at baseline. . CT of head was negative. Patient  admitted to the hospital medicine service for further evaluation and management.     MRI of brain ordered and CT of chest to further evaluate abnormal findings on CXR.  MRI of brain showed moderate atrophy with moderate chronic ischemic small vessel changes.  No acute findings.  CT of chest showed paraesophageal hernia, crescentic consolidative small airspace opacity right base with trace  effusion. There is marked obstructive and fibrotic lung disease with  moderate centrilobular emphysema throughout both lungs otherwise no acute findings.  Patient was started on Rocephin and doxycycline for community-acquired pneumonia, now on oral doxycycline and ceftin orally for additional 8 days to complete 10 days of therapy.    Patient noted to have elevated creatinine, ALMITA upon admission, this has improved with IV fluids.Now Cr1.3- based on review of prior labs6/2015 may have some underlying CKD. ? Baseline.     Patient with a history of hypertension at this time allowing reasonably higher blood pressure given recent falls and age.  Will not prescribe losartan at time of discharge, discontinue Imdur.  Systolic pressure has primarily been running in the range of 140s to 160s.  Occasional fluctuations in blood pressure improved with recheck without treatment.    .     Neurology was consulted, following patient during course of stay, Imdur was discontinued secondary to orthostatic symptoms w/ concerns for falls.  Dizziness has improved.  At time of discharge will continue to hold any nitrates.   the agrees  patient is most appropriate for rehabilitation for debilitation senile gait disorder. Dr Knox recommends pt f/u with PCP for further  management at ID. Pt continues mirapex for RLS 0.5mg daily., sx should improve with increased activity.     PT/ OT recommending inpatient rehabilitation prior to returning home secondary to concerns for safety given impaired mobility /strength.     Case management has been working with patient and family regarding discharge physician, patient has been accepted to [facility] pulmonary rehabilitation unit.    Patient is currently afebrile, satting 95% on room air, taking by mouth well.  Last BM 4/6/17.Urine cultures showed no growth today, blood cultures remained negative.  Patient is now clinically improved and medically appropriate for discharge to rehabilitation.  Patient will need to be seen by provider within 24 hours of admission.  Patient is to follow-up with PCP within 1 week of discharge from facility.  Patient to keep any cardiology appointments.  Procedures Performed      none    Consults:   Consults     Date and Time Order Name Status Description    4/4/2017 1425 Neurology (on-call MD unless specified) Completed           Pertinent Test Results:  Basic Metabolic Panel [[id number]] (Abnormal) Collected: 04/06/17 0532        Lab Status: Final result Specimen: Blood Updated: 04/06/17 0719        Glucose 70 mg/dL         BUN 21 mg/dL         Creatinine 1.30 mg/dL         Sodium 141 mmol/L         Potassium 4.3 mmol/L         Chloride 109 mmol/L         CO2 28.0 mmol/L         Calcium 9.1 mg/dL         eGFR Non African Amer 39 (L) mL/min/1.73         BUN/Creatinine Ratio 16.2        Anion Gap 4.0 mmol/L        Narrative:         National Kidney Foundation Guidelines    Stage                           Description                             GFR                      1                               Normal or High                          90+  2                               Mild decrease                            60-89  3                               Moderate decrease                   30-59  4                                Severe decrease                       15-29  5                               Kidney failure                             <15       CBC Auto Differential [[id number]] (Abnormal) Collected: 04/06/17 0532       Lab Status: Final result Specimen: Blood Updated: 04/06/17 0658        WBC 5.37 10*3/mm3         RBC 3.36 (L) 10*6/mm3         Hemoglobin 10.4 (L) g/dL         Hematocrit 32.7 (L) %         MCV 97.3 fL         MCH 31.0 pg         MCHC 31.8 (L) g/dL         RDW 13.3 %         RDW-SD 47.0 fl         MPV 10.3 fL         Platelets 240 10*3/mm3         Neutrophil % 55.8 %         Lymphocyte % 31.1 %         Monocyte % 10.6 %         Eosinophil % 1.9 %         Basophil % 0.4 %         Immature Grans % 0.2 %         Neutrophils, Absolute 3.00 10*3/mm3         Lymphocytes, Absolute 1.67 10*3/mm3         Monocytes, Absolute 0.57 10*3/mm3         Eosinophils, Absolute 0.10 10*3/mm3         Basophils, Absolute 0.02 10*3/mm3       Imaging Results (most recent)     Procedure Component Value Units Date/Time    CT Chest Without Contrast [[id number]] Collected:  04/04/17 1542     Updated:  04/04/17 1601    Narrative:       EXAMINATION: CT CHEST WO CONTRAST- 04/04/2017     INDICATION: R53.1-Weakness; R41.82-Altered mental status, unspecified;  J18.9-Pneumonia, unspecified organism         TECHNIQUE:      CT data set of the chest and mediastinum was performed without  intravenous contrast enhancement.     The radiation dose reduction device was turned on for each scan per the  ALARA (As Low as Reasonably Achievable) protocol.     COMPARISON: Compared to chest radiographs performed prior to the  examination.     FINDINGS:   1. The suspected retrocardiac density was, indeed, densely calcified  mitral valve annulus. The patient has marked four-chamber cardiomegaly  and there is a mild amount of pericardial thickening.  2. In addition, some of the retrocardiac density is produced by a large  hiatus hernia with partial  intrathoracic stomach.  3. There is a small airspace opacity with minimal crescentic  opacification and consolidation at the right lung base with a trace  effusion.  4. There are multiple hepatic cysts seen in all segments of the liver.  The largest of the hepatic cysts is in the right lower lobe and measures  6.6 cm. Please note that some large liver cysts can have a slight  increased risk of malignant degeneration. A solid liver mass is not  currently identified otherwise.  5. There is marked degenerative disease and arthropathy of the right  shoulder with bone on bone and there is a large right shoulder effusion.  Please note this finding.  6. The adrenal glands remainder of the upper abdominal contents are  unremarkable. The extended window datasets demonstrate significant  centrilobular emphysema and obstructive lung disease with mild  interstitial fibrosis.       Impression:       1. The apparent lower lung or retrocardiac density is produced by a  large hiatus hernia with partial intrathoracic stomach in combination  with mitral annulus calcification which is exceedingly dense.   2. The patient has significant cardiomegaly with thickening of the  pericardium. Marked liver cysts are identified throughout all areas of  the liver as described and measured above. There is bone on bone with  right shoulder arthropathy and a large right shoulder effusion. There is  a crescentic consolidative small airspace opacity right base with trace  effusion. There is marked obstructive and fibrotic lung disease with  moderate centrilobular emphysema throughout both lungs. Central  mediastinal bulky adenopathy is otherwise not identified. Nonetheless,  please note the multiple abnormal findings     D:  04/04/2017  E:  04/04/2017        This report was finalized on 4/4/2017 3:59 PM by Dr. Jerry Du MD.       XR Chest 1 View [[id number]] Collected:  04/04/17 1623     Updated:  04/04/17 1631    Narrative:       EXAMINATION: XR  CHEST 1 VIEW-04/04/2017:      INDICATION: Aspiration.      COMPARISON: NONE.     FINDINGS:   1. Cardiomegaly is noted. Some mild nodular densities are seen in the  retrocardiac region of the left lower lung. Some of this could be mitral  annulus calcification but this is somewhat more lateral than one would  expect.      2. Significant or extensive other airway disease, consolidation or free  fluid is not identified. The mid and upper lung zones are clear.       Impression:       1.  There is minimal airspace opacity in the retrocardiac left lower  lobe. Activity and acute status is indeterminate. It appears to be new  from 01/17/2017.  2.  Otherwise, there is no other acute or active disease elsewhere.     D:  04/04/2017  E:  04/04/2017     This report was finalized on 4/4/2017 4:29 PM by Dr. Jerry Du MD.       MRI Brain Without Contrast [[id number]]  (Abnormal) Collected:  04/04/17 1408     Updated:  04/04/17 1855    Narrative:         EXAM:    MR Head Without Intravenous Contrast    CLINICAL HISTORY:    89 years old, female; Pneumonia, unspecified organism; Altered mental status,   unspecified; Weakness; Signs and symptoms; Altered mental status/memory loss   and malaise or fatigue and weakness, extremity; Bilateral; Confusion or   disorientation; Patient HX: Altered mental status and confusion, patient   becoming more lethargic per family, general weakness; Additional info: Ms   changes    TECHNIQUE:    Magnetic resonance images of the head/brain without intravenous contrast in   multiple planes.    EXAM DATE/TIME:    4/4/2017 2:08 PM    COMPARISON:    MR BRAIN WO CONTRAST 6/2/2015 12:13:11 PM    FINDINGS:    Brain:  Moderate atrophy. Moderate periventricular white matter hyperintense   signal with moderate number of scattered subcortical white matter   hyperintensities. Mild patchy increased signal in the urmila. Mild patchy   hyperintense signal in the basal ganglia.  No hemorrhage.  No acute infarct.     Ventricles: Prominence of the CSF spaces.  No obstructive ventriculomegaly.    Bones/joints:  Unremarkable.    Sinuses:  Unremarkable as visualized.  No acute sinusitis.    Mastoid air cells:  Unremarkable as visualized.  No mastoid effusion.    Orbits:  Unremarkable as visualized.      Impression:       Moderate atrophy with moderate chronic ischemic small vessel white   matter disease as the most likely diagnosis. Increased white matter disease   compared to previous exam 2015.    No acute infarct, hemorrhage or mass.    THIS DOCUMENT HAS BEEN ELECTRONICALLY SIGNED BY AMY ZAVALA MD    CT Head Without Contrast [[id number]] Collected:  04/05/17 0912     Updated:  04/05/17 1200    Narrative:       EXAMINATION: CT HEAD WITHOUT CONTRAST-04/04/2017:      INDICATION: MS changes, altered mental status.     TECHNIQUE:  CT data set of the brain was performed without intravenous  contrast.     COMPARISON: Compared to previous and most recent CT data sets of the  brain of 01/17/2017.     FINDINGS:   1. The foramen magnum is clear. Basal cisterns are negative for  subarachnoid bleed.  2. Senile atrophy is seen peripherally. There is microangiopathy in the  white matter with degenerative calcifications diffusely.  3. Superimposed infarct, hemorrhage or edema is not identified. There is  no cerebral contusion or midline shift. There is no evidence of  extracerebral collection.  4. There is mastoid fluid on the left. There is no evidence of skull  fracture.       Impression:       1.  Senile changes within the brain with atrophy and microangiopathy.  2.  No evidence of cerebral contusion, edema or infarct. There is no  extracerebral collection. There is mastoid fluid.  3.  Mucosal edema is seen in the inferior maxillary sinuses without air  fluid level.     D:  04/04/2017  E:  04/05/2017     This report was finalized on 4/5/2017 11:57 AM by Dr. Jerry Du MD.           Condition on Discharge:  stable    Physical Exam on  "Discharge:BP (160/104)(BP Location: Left arm, Patient /Position: sitting)  Pulse 87  Temp 98.1 °F (36.7 °C) (Oral)   Resp 18  Ht 64\" (162.6 cm)  Wt 165 lb (74.8 kg) Comment: per daugther   SpO2 95%  BMI 28.32 kg/m2  Physical Exam  Temp:  [98.1 °F (36.7 °C)] 98.1 °F (36.7 °C)  Heart Rate:  [63-87] 87  Resp:  [14-18] 18  BP: (167-184)/(57-65) 184/65  Constitutional: sitting up in bed, no acute distress, awake, alert  Eyes: sclerae anicteric, no conjunctival injection  Neck: supple,  trachea midline  Respiratory: Diminished throughout, no wheezes/rales/rhonchi, nonlabored respirations   Cardiovascular: RRR, III/VI systolic murmurs, ;No rubs, or gallops, palpable pedal pulses bilaterally  Gastrointestinal: Positive bowel sounds, soft, nontender, nondistended  Musculoskeletal: No bilateral ankle edema, no clubbing or cyanosis to bilateral lower extremities  Psychiatric: oriented x 3, appropriate affect, cooperative  Neurologic: hypophonic,  Generalize weakness w/Strength symmetric in all extremities, follows commands  Discharge Disposition  Rehab Facility or Unit (DC - External)    Discharge Medications   Meche Duarte   [place] Medication Instructions OSCAR:[id number]    Printed on:04/07/17 1117   Medication Information                      albuterol (PROVENTIL HFA;VENTOLIN HFA) 108 (90 BASE) MCG/ACT inhaler  Inhale 2 puffs Every 4 (Four) Hours As Needed for Wheezing.             benzonatate (TESSALON) 100 MG capsule  Take 1 capsule by mouth 3 (Three) Times a Day As Needed for Cough.             bisacodyl (DULCOLAX) 5 MG EC tablet  Take 2 tablets by mouth Daily As Needed for Constipation.             calcium carbonate (TUMS) 500 MG chewable tablet  Chew 1,000 mg 2 (Two) Times a Day As Needed for Heartburn.             carbidopa-levodopa CR (SINEMET CR)  MG per CR tablet  Take 1 tablet by mouth 2 (Two) Times a Day.             cefuroxime (CEFTIN) 250 MG tablet  Take 1 tablet by mouth Every 12 (Twelve) Hours. " Indications: Upper Respiratory Tract Infection             docusate sodium (COLACE) 100 MG capsule  Take 100 mg by mouth Daily As Needed for Constipation.             doxycycline (VIBRAMYCIN) 100 MG capsule  Take 1 capsule by mouth Every 12 (Twelve) Hours for 8 days. Indications: Upper Respiratory Tract Infection             fluticasone-salmeterol (ADVAIR) 250-50 MCG/DOSE DISKUS  Inhale 2 puffs 2 (Two) Times a Day.             ofloxacin (OCUFLOX) 0.3 % ophthalmic solution  Administer 1 drop into the left eye 4 (Four) Times a Day.             pantoprazole (PROTONIX) 40 MG EC tablet  Take 40 mg by mouth Daily.             polyethylene glycol (MIRALAX) packet  Take 17 g by mouth Daily.             pramipexole (MIRAPEX) 0.5 MG tablet  Take 1 tablet by mouth Every Night.             prednisoLONE sodium phosphate (INFLAMASE FORTE) 1 % ophthalmic solution  Administer 1 drop to the right eye 2 (Two) Times a Day.             saccharomyces boulardii (FLORASTOR) 250 MG capsule  Take 1 capsule by mouth 2 (Two) Times a Day for 8 days.                 Discharge Diet:   Diet Instructions     Diet: Soft Texture, Cardiac; Thin Liquids, No Restrictions; Whole       Discharge Diet:   Soft Texture  Cardiac      Fluid Consistency:  Thin Liquids, No Restrictions   Soft Options:  Whole                 Discharge Care Plan / Instructions:    Activity at Discharge:   Activity Instructions     Activity as Tolerated                     Follow-up Appointments  No future appointments.  Additional Instructions for the Follow-ups that You Need to Schedule     Discharge Follow-Up With Specified Provider    As directed    To:  Provider at [facility]   Follow Up Details:  within 24hr       Discharge Follow-up with PCP    As directed    Follow Up Details:  needs to follow up with primary care within 1 week of discharge                 Test Results Pending at Discharge   Order Current Status    Blood Culture Preliminary result    Blood Culture Preliminary  result           Casie M Mayne, PA 04/07/17 11:17 AM    Time: Discharge 40 min    Please note that portions of this note may have been completed with a voice recognition program. Efforts were made to edit the dictations, but occasionally words are mistranscribed.

## 2017-04-07 NOTE — PLAN OF CARE
Problem: Patient Care Overview (Adult)  Goal: Plan of Care Review  Outcome: Ongoing (interventions implemented as appropriate)    04/07/17 1019   Coping/Psychosocial Response Interventions   Plan Of Care Reviewed With patient;family   Outcome Evaluation   Outcome Summary/Follow up Plan pt [place] alert and following commands.still needs assist for transfers and gait.work on longer strides,lt leg weaker   Patient Care Overview   Progress improving         Problem: Inpatient Physical Therapy  Goal: Bed Mobility Goal LTG- PT  Outcome: Outcome(s) achieved Date Met:  04/07/17 04/05/17 1046 04/07/17 1019   Bed Mobility PT LTG   Bed Mobility PT LTG, Date Established 04/05/17 --    Bed Mobility PT LTG, Time to Achieve 2 wks --    Bed Mobility PT LTG, Activity Type supine to sit/sit to supine --    Bed Mobility PT LTG, [place] Level minimum assist (75% patient effort);2 person assist required --    Bed Mobility PT LTG, Outcome --  goal met       Goal: Transfer Training Goal 1 LTG- PT  Outcome: Ongoing (interventions implemented as appropriate)    04/05/17 1046 04/07/17 1019   Transfer Training PT LTG   Transfer Training PT LTG, Date Established 04/05/17 --    Transfer Training PT LTG, Time to Achieve 2 wks --    Transfer Training PT LTG, Activity Type sit to stand/stand to sit --    Transfer Training PT LTG, [place] Level contact guard assist --    Transfer Training PT LTG, Assist Device walker, rolling --    Transfer Training PT LTG, Outcome --  goal ongoing       Goal: Gait Training Goal LTG- PT  Outcome: Ongoing (interventions implemented as appropriate)    04/05/17 1046 04/07/17 1019   Gait Training PT LTG   Gait Training Goal PT LTG, Date Established 04/05/17 --    Gait Training Goal PT LTG, Time to Achieve 2 wks --    Gait Training Goal PT LTG, [place] Level minimum assist (75% patient effort) --    Gait Training Goal PT LTG, Assist Device walker, rolling --    Gait Training Goal PT LTG, Distance to  Achieve 150 --    Gait Training Goal PT LTG, Outcome --  goal ongoing       Goal: Stair Training Goal LTG- PT  Outcome: Ongoing (interventions implemented as appropriate)    04/05/17 1046 04/07/17 1019   Stair Training PT LTG   Stair Training Goal PT LTG, Date Established 04/05/17 --    Stair Training Goal PT LTG, Time to Achieve 2 wks --    Stair Training Goal PT LTG, Number of Steps 1 --    Stair Training Goal PT LTG, [place] Level minimum assist (75% patient effort) --    Stair Training Goal PT LTG, Assist Device walker, rolling --    Stair Training Goal PT LTG, Outcome --  goal ongoing

## 2017-04-07 NOTE — PROGRESS NOTES
Continued Stay Note  Psychiatric     Patient Name: Meche Duarte  MRN: [id number]  Today's Date: 4/7/2017    Admit Date: 4/4/2017          Discharge Plan       04/07/17 0924    Case Management/Social Work Plan    Plan [facility]    Patient/Family In Agreement With Plan yes    Additional Comments Spoke with Shala at [facility], they have an acute bed for Mrs. Duarte on the pulmonary unit. Pt is medically ready for transfer today.  Nurse will need to call report to [phone].  Will need to send transfer summary with patient.  Daughter to transport at discharge.  No other discharge needs identified.                Discharge Codes       04/07/17 0926    Discharge Codes    Discharge Codes 90  R- To rehab facility        Expected Discharge Date and Time     Expected Discharge Date Expected Discharge Time    Apr 7, 2017             Bobbi Woods RN

## 2017-04-07 NOTE — PROGRESS NOTES
Neurology       Patient Care Team:  No Known Provider as PCP - General    Chief complaint: Restless leg    History:  Her only complaint is that she had trouble sleeping last night with restless leg syndrome.    She took a half milligram of Mirapex that she's done every night.    He is approved for [facility] is going today.      Past Medical History:   Diagnosis Date   • Asthma    • CHF (congestive heart failure)    • Falls frequently    • GERD (gastroesophageal reflux disease)    • [place] (hard of hearing)    • Hypertension    • Restless leg syndrome    • Shingles     Right Eye       Vital Signs   Vitals:    04/06/17 1926 04/06/17 2251 04/07/17 0646 04/07/17 0757   BP:  167/57  (!) 184/65   BP Location:    Left arm   Patient Position:    Lying   Pulse: 70 65 71 87   Resp: 14 16 18 18   Temp:  98.1 °F (36.7 °C)  98.1 °F (36.7 °C)   TempSrc:  Oral  Oral   SpO2:    95%   Weight:       Height:           Physical Exam:   General: Alert and oriented              Neuro: Diffusely weak with no focal changes.    Speech is soft but articulate.        Results Review:  Reviewed    Results from last 7 days  Lab Units 04/06/17  0532   WBC 10*3/mm3 5.37   HEMOGLOBIN g/dL 10.4*   HEMATOCRIT % 32.7*   PLATELETS 10*3/mm3 240       Results from last 7 days  Lab Units 04/06/17  0532 04/05/17  0616 04/04/17  1242   SODIUM mmol/L 141 140 141   POTASSIUM mmol/L 4.3 4.8 4.8   CHLORIDE mmol/L 109 112* 105   TOTAL CO2 mmol/L 28.0 23.0 26.0   BUN mg/dL 21 13 19   CREATININE mg/dL 1.30 1.20 1.40*   CALCIUM mg/dL 9.1 9.0 9.3   BILIRUBIN mg/dL  --   --  0.3   ALK PHOS U/L  --   --  79   ALT (SGPT) U/L  --   --  3*   AST (SGOT) U/L  --   --  19   GLUCOSE mg/dL 70 65* 84     Imaging Results (last 24 hours)     ** No results found for the last 24 hours. **          Assessment:  Senile gait disorder    Frequent falls    Weakness secondary to inactivity    Plan:  Agitation is transferred to the [facility] pulmonary units day.    If the restless  leg is problematic she can take an extra 0.25 mg at bedtime.    This should only be on an as-needed basis    Comment:  Guarded prognosis, although the patient seems enthusiastic about more activity         I discussed the patients findings and my recommendations with patient and primary care team    Elian Knox MD  04/07/17  10:52 AM

## 2017-04-07 NOTE — PROGRESS NOTES
Acute Care - Physical Therapy Treatment Note  [facility]     Patient Name: Meche Duarte  : 1927  MRN: [id number]  Today's Date: 2017  Onset of Illness/Injury or Date of Surgery Date: 17  Date of Referral to PT: 17  Referring Physician: Edmund    Admit Date: 2017    Visit Dx:    ICD-10-CM ICD-9-CM   1. Weakness R53.1 780.79   2. Altered mental status, unspecified altered mental status type R41.82 780.97   3. Pneumonia of left lower lobe due to infectious organism J18.9 483.8   4. Impaired mobility and ADLs Z74.09 799.89     Patient Active Problem List   Diagnosis   • Weakness   • ALMITA (acute kidney injury)   • HTN (hypertension)   • RLS (restless legs syndrome)   • Altered mental status   • [place] (hard of hearing)   • Asthma   • Shortness of breath   • CAP (community acquired pneumonia)               Adult Rehabilitation Note       17 0915 17 0930       Rehab Assessment/Intervention    Discipline physical therapy assistant  -UD      Document Type therapy note (daily note)  -UD      Subjective Information agree to therapy;complains of;weakness  -UD      Patient Effort, Rehab Treatment good  -UD      Symptoms Noted During/After Treatment fatigue  -UD      Precautions/Limitations fall precautions  -UD      Recorded by [UD] Rosanna Lloyd PTA      Vital Signs    Pre Systolic BP Rehab  131  -EH     Pre Treatment Diastolic BP  54  -EH     Intra Systolic BP Rehab  129  -EH     Intra Treatment Diastolic BP  74  -EH     Post Systolic BP Rehab  179  -EH     Post Treatment Diastolic BP  79  -EH     Pretreatment Heart Rate (beats/min)  73  -EH     Intratreatment Heart Rate (beats/min)  122  -EH     Posttreatment Heart Rate (beats/min)  97  -EH     Pre SpO2 (%)  95  -EH     O2 Delivery Pre Treatment  room air  -EH     Post SpO2 (%)  94  -EH     O2 Delivery Post Treatment room air  -UD room air  -EH     Pre Patient Position Supine  -UD Supine  -EH     Intra Patient Position Standing  -UD Sitting   -EH     Post Patient Position Sitting  -UD Standing  -EH     Recorded by [UD] Rosanna Lloyd PTA [EH] Kierra Day, PT     Pain Assessment    Pain Assessment No/denies pain  -UD No/denies pain  -EH     Recorded by [UD] Rosanna Lloyd PTA [EH] Kierra Day, PT     Vision Assessment/Intervention    Visual Impairment  visual impairment, right   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  -EH     Recorded by  [] Kierra Day, PT     Cognitive Assessment/Intervention    Orientation Status oriented x 4  -UD      Follows Commands/Answers Questions 100% of the time  -UD      Personal Safety Interventions fall prevention program maintained;gait belt  -UD      Recorded by [UD] Rosanna Lloyd PTA      Bed Mobility, Assessment/Treatment    Bed Mob, Supine to Sit, [place] minimum assist (75% patient effort);verbal cues required  -UD      Recorded by [UD] Rosanna Lloyd PTA      Transfer Assessment/Treatment    Transfers, Sit-Stand [place] minimum assist (75% patient effort);2 person assist required  -UD      Transfers, Stand-Sit [place] minimum assist (75% patient effort);2 person assist required  -UD      Transfers, Sit-Stand-Sit, Assist Device rolling walker  -UD      Transfer, Safety Issues  balance decreased during turns;sequencing ability decreased  -EH     Transfer, Impairments  strength decreased;impaired balance  -EH     Transfer, Comment  demo correct HP; pt switched to pull up onto PT instead of one hand on walker. hygiene completed in standing.  -EH     Recorded by [UD] Rosanna Lloyd PTA [EH] Kierra Day, PT     Gait Assessment/Treatment    Gait, [place] Level minimum assist (75% patient effort);1 person + 1 person to manage equipment  -UD      Gait, Assistive Device rolling walker  -UD      Gait, Distance (Feet) 45  -UD 5  -EH     Gait, Gait Deviations festinating;step length decreased  -UD      Gait, Safety Issues step length decreased  -UD      Gait, Impairments strength decreased  -UD       Gait, Comment --   chair behind  -UD      Recorded by [UD] Rosanna Lloyd PTA [] Kierra Day, PT     Balance Skills Training    Sitting-Level of Assistance Contact guard  -UD      Standing-Level of Assistance Minimum assistance  -UD      Recorded by [UD] Rosanna Lloyd PTA      Therapy Exercises    Bilateral Lower Extremities AROM:;10 reps;sitting  -UD      Bilateral Upper Extremity AROM:;10 reps;sitting  -UD      Recorded by [UD] Rosanna Lloyd PTA      Positioning and Restraints    Pre-Treatment Position in bed  -UD      Post Treatment Position chair  -UD      In Chair notified nsg;reclined;sitting;call light within reach;exit alarm on;with family/caregiver;legs elevated  -UD      Recorded by [UD] Rosanna Lloyd PTA        User Key  (r) = Recorded By, (t) = Taken By, (c) = Cosigned By    Initials Name Effective Dates    UD Rosanna Lloyd, LIV 06/22/15 -      Kierra Day, PT 06/19/15 -                  PT Goals       04/07/17 1019 04/05/17 1046       Bed Mobility PT LTG    Bed Mobility PT LTG, Date Established  04/05/17  -     Bed Mobility PT LTG, Time to Achieve  2 wks  -EH     Bed Mobility PT LTG, Activity Type  supine to sit/sit to supine  -     Bed Mobility PT LTG, [place] Level  minimum assist (75% patient effort);2 person assist required  -     Bed Mobility PT LTG, Outcome goal met  -UD      Transfer Training PT LTG    Transfer Training PT LTG, Date Established  04/05/17  -     Transfer Training PT LTG, Time to Achieve  2 wks  -EH     Transfer Training PT LTG, Activity Type  sit to stand/stand to sit  -     Transfer Training PT LTG, [place] Level  contact guard assist  -EH     Transfer Training PT LTG, Assist Device  walker, rolling  -EH     Transfer Training PT LTG, Outcome goal ongoing  -UD      Gait Training PT LTG    Gait Training Goal PT LTG, Date Established  04/05/17  -     Gait Training Goal PT LTG, Time to Achieve  2 wks  -EH     Gait Training Goal PT LTG,  [place] Level  minimum assist (75% patient effort)  -     Gait Training Goal PT LTG, Assist Device  walker, rolling  -EH     Gait Training Goal PT LTG, Distance to Achieve  150  -     Gait Training Goal PT LTG, Outcome goal ongoing  -      Stair Training PT LTG    Stair Training Goal PT LTG, Date Established  04/05/17  -     Stair Training Goal PT LTG, Time to Achieve  2 wks  -EH     Stair Training Goal PT LTG, Number of Steps  1  -EH     Stair Training Goal PT LTG, [place] Level  minimum assist (75% patient effort)  -     Stair Training Goal PT LTG, Assist Device  walker, rolling  -EH     Stair Training Goal PT LTG, Outcome goal ongoing  -UD        User Key  (r) = Recorded By, (t) = Taken By, (c) = Cosigned By    Initials Name Provider Type    GLADYS Lloyd, PTA Physical Therapy Assistant     Kierra Day, PT Physical Therapist          Physical Therapy Education     Title: PT OT SLP Therapies (Active)     Topic: Physical Therapy (Done)     Point: Mobility training (Done)    Learning Progress Summary    Learner Readiness Method Response Comment Documented by Status   Patient LICHA Justice,[facility] 04/07/17 1018 Done    Acceptance E ,[facility] 04/05/17 1046 Done   Family LICHA Justice,[facility] 04/07/17 1018 Done               Point: Home exercise program (Done)    Learning Progress Summary    Learner Readiness Method Response Comment Documented by Status   Patient LICHA Justice,[facility] 04/07/17 1018 Done    Acceptance E ,[facility] 04/05/17 1046 Done   Family LICHA Justice,[facility] 04/07/17 1018 Done               Point: Body mechanics (Done)    Learning Progress Summary    Learner Readiness Method Response Comment Documented by Status   Patient LICHA Justice,[facility] 04/07/17 1018 Done    Acceptance E VU,[facility] 04/05/17 1046 Done   Family LICHA Justice,[facility] 04/07/17 1018 Done               Point: Precautions (Done)    Learning Progress Summary    Learner Readiness Method Response Comment Documented by  Status   Patient Eager LICHA GUAN,NR   04/07/17 1018 Done    Acceptance E VU,NR   04/05/17 1046 Done   Family Eager LICHA GUAN,NR   04/07/17 1018 Done                      User Key     Initials Effective Dates Name Provider Type Discipline     06/22/15 -  Rosanna Lloyd, PTA Physical Therapy Assistant PT     06/19/15 -  Kierra Day, PT Physical Therapist PT                    PT Recommendation and Plan  Planned Therapy Interventions: balance training, bed mobility training, gait training, home exercise program, strengthening, transfer training  PT Frequency: daily  Plan of Care Review  Plan Of Care Reviewed With: patient, family  Progress: improving  Outcome Summary/Follow up Plan: pt [place] alert and following commands.still needs assist for transfers and gait.work on longer strides,lt leg weaker          Outcome Measures       04/07/17 0915 04/05/17 0930 04/05/17 0915    How much help from another person do you currently need...    Turning from your back to your side while in flat bed without using bedrails? 3  -UD 2  -EH     Moving from lying on back to sitting on the side of a flat bed without bedrails? 3  -UD 2  -EH     Moving to and from a bed to a chair (including a wheelchair)? 2  -UD 2  -EH     Standing up from a chair using your arms (e.g., wheelchair, bedside chair)? 2  -UD 2  -EH     Climbing 3-5 steps with a railing? 1  -UD 1  -EH     To walk in hospital room? 3  -UD 2  -EH     AM-PAC 6 Clicks Score 14  -UD 11  -EH     How much help from another is currently needed...    Putting on and taking off regular lower body clothing?   2  -EL    Bathing (including washing, rinsing, and drying)   2  -EL    Toileting (which includes using toilet bed pan or urinal)   1  -EL    Putting on and taking off regular upper body clothing   2  -EL    Taking care of personal grooming (such as brushing teeth)   2  -EL    Eating meals   3  -EL    Score   12  -EL    Functional Assessment    Outcome Measure Options  AM-PAC 6  Clicks Basic Mobility (PT)  - AM-PAC 6 Clicks Daily Activity (OT)  -EL      User Key  (r) = Recorded By, (t) = Taken By, (c) = Cosigned By    Initials Name Provider Type    GLADYS Lloyd PTA Physical Therapy Assistant     Kierra Day, PT Physical Therapist    EARLENE Amaya, OT Occupational Therapist           Time Calculation:         PT Charges       04/07/17 1021          Time Calculation    PT Received On 04/07/17  -UD      PT Goal Re-Cert Due Date 04/15/17  -      Time Calculation- PT    Total Timed Code Minutes- PT 24 minute(s)  -UD        User Key  (r) = Recorded By, (t) = Taken By, (c) = Cosigned By    Initials Name Provider Type    GLADYS Lloyd PTA Physical Therapy Assistant          Therapy Charges for Today     Code Description Service Date Service Provider Modifiers Qty    28815882512 HC PT THER PROC EA 15 MIN 4/7/2017 Rosanna Lloyd, PTA GP 1    18045680231 HC GAIT TRAINING EA 15 MIN 4/7/2017 Rosanna Lloyd, PTA GP 1    70877930699 HC PT THER SUPP EA 15 MIN 4/7/2017 Rosanna Lloyd, PTA GP 1          PT G-Codes  Outcome Measure Options: AM-PAC 6 Clicks Basic Mobility (PT)    Rosanna Lloyd PTA  4/7/2017

## 2017-04-09 LAB
BACTERIA SPEC AEROBE CULT: NORMAL
BACTERIA SPEC AEROBE CULT: NORMAL

## 2017-04-11 NOTE — THERAPY DISCHARGE NOTE
Acute Care - Occupational Therapy Discharge Summary  [facility]     Patient Name: Meche Duarte  : 1927  MRN: [id number]    Today's Date: 2017  Onset of Illness/Injury or Date of Surgery Date: 17    Date of Referral to OT: 17  Referring Physician: Edmund      Admit Date: 2017        OT Recommendation and Plan    Visit Dx:    ICD-10-CM ICD-9-CM   1. Weakness R53.1 780.79   2. Altered mental status, unspecified altered mental status type R41.82 780.97   3. Pneumonia of left lower lobe due to infectious organism J18.9 483.8   4. Impaired mobility and ADLs Z74.09 799.89                     OT Goals       17 1058          Bed Mobility OT LTG    Bed Mobility OT LTG, Date Established 17  -EL      Bed Mobility OT LTG, Time to Achieve by discharge  -EL      Bed Mobility OT LTG, Activity Type supine to sit/sit to supine  -EL      Bed Mobility OT LTG, [place] Level minimum assist (75% patient effort);verbal cues required  -EL      Transfer Training OT LTG    Transfer Training OT LTG, Date Established 17  -EL      Transfer Training OT LTG, Time to Achieve by discharge  -EL      Transfer Training OT LTG, Activity Type sit to stand/stand to sit;toilet  -EL      Transfer Training OT LTG, [place] Level contact guard assist;verbal cues required  -EL      Transfer Training OT LTG, Assist Device walker, rolling  -EL      Functional Mobility OT LTG    Functional Mobility Goal OT LTG, Date Established 17  -EL      Functional Mobility Goal OT LTG, Time to Achieve by discharge  -EL      Functional Mobility Goal OT LTG, [place] Level contact guard  -EL      Functional Mobility Goal OT LTG, Assist Device rolling walker  -EL      Functional Mobility Goal OT LTG, Distance to Achieve to the bathroom;to the door  -EL      Activity Tolerance OT LTG    Activity Tolerance Goal OT LTG, Date Established 17  -EL      Activity Tolerance Goal OT LTG, Time to Achieve by discharge  -EL       Activity Tolerance Goal OT LTG, Activity Level 15 min activity  -EL      Activity Tolerance Goal OT LTG, Additional Goal with 2 rest breaks   -EL        User Key  (r) = Recorded By, (t) = Taken By, (c) = Cosigned By    Initials Name Provider Type    EARLENE Amaya, OT Occupational Therapist                  OT Discharge Summary  Reason for Discharge: Discharge from facility  Outcomes Achieved: Refer to plan of care for updates on goals achieved  Discharge Destination: Inpatient rehabilitation facility      Aileen Solo OT  4/11/2017

## 2017-04-17 ENCOUNTER — APPOINTMENT (OUTPATIENT)
Dept: CARDIOLOGY | Facility: HOSPITAL | Age: 82
End: 2017-04-17
Attending: EMERGENCY MEDICINE

## 2017-04-17 ENCOUNTER — APPOINTMENT (OUTPATIENT)
Dept: GENERAL RADIOLOGY | Facility: HOSPITAL | Age: 82
End: 2017-04-17

## 2017-04-17 ENCOUNTER — APPOINTMENT (OUTPATIENT)
Dept: NUCLEAR MEDICINE | Facility: HOSPITAL | Age: 82
End: 2017-04-17

## 2017-04-17 ENCOUNTER — HOSPITAL ENCOUNTER (EMERGENCY)
Facility: HOSPITAL | Age: 82
Discharge: OTHER INSTITUTION | End: 2017-04-18
Attending: EMERGENCY MEDICINE | Admitting: EMERGENCY MEDICINE

## 2017-04-17 DIAGNOSIS — R53.1 GENERALIZED WEAKNESS: ICD-10-CM

## 2017-04-17 DIAGNOSIS — R09.02 HYPOXIA: Primary | ICD-10-CM

## 2017-04-17 LAB
ALBUMIN SERPL-MCNC: 3.7 G/DL (ref 3.2–4.8)
ALBUMIN/GLOB SERPL: 1.1 G/DL (ref 1.5–2.5)
ALP SERPL-CCNC: 93 U/L (ref 25–100)
ALT SERPL W P-5'-P-CCNC: 5 U/L (ref 7–40)
ANION GAP SERPL CALCULATED.3IONS-SCNC: 7 MMOL/L (ref 3–11)
AST SERPL-CCNC: 27 U/L (ref 0–33)
BASOPHILS # BLD AUTO: 0.03 10*3/MM3 (ref 0–0.2)
BASOPHILS NFR BLD AUTO: 0.6 % (ref 0–1)
BH CV LOWER VASCULAR LEFT COMMON FEMORAL AUGMENT: NORMAL
BH CV LOWER VASCULAR LEFT COMMON FEMORAL COMPRESS: NORMAL
BH CV LOWER VASCULAR LEFT COMMON FEMORAL PHASIC: NORMAL
BH CV LOWER VASCULAR LEFT COMMON FEMORAL SPONT: NORMAL
BH CV LOWER VASCULAR LEFT DISTAL FEMORAL COMPRESS: NORMAL
BH CV LOWER VASCULAR LEFT GASTRONEMIUS COMPRESS: NORMAL
BH CV LOWER VASCULAR LEFT GREATER SAPH AK COMPRESS: NORMAL
BH CV LOWER VASCULAR LEFT GREATER SAPH BK COMPRESS: NORMAL
BH CV LOWER VASCULAR LEFT LESSER SAPH COMPRESS: NORMAL
BH CV LOWER VASCULAR LEFT MID FEMORAL AUGMENT: NORMAL
BH CV LOWER VASCULAR LEFT MID FEMORAL COMPRESS: NORMAL
BH CV LOWER VASCULAR LEFT MID FEMORAL PHASIC: NORMAL
BH CV LOWER VASCULAR LEFT MID FEMORAL SPONT: NORMAL
BH CV LOWER VASCULAR LEFT PERONEAL COMPRESS: NORMAL
BH CV LOWER VASCULAR LEFT POPLITEAL AUGMENT: NORMAL
BH CV LOWER VASCULAR LEFT POPLITEAL COMPRESS: NORMAL
BH CV LOWER VASCULAR LEFT POPLITEAL PHASIC: NORMAL
BH CV LOWER VASCULAR LEFT POPLITEAL SPONT: NORMAL
BH CV LOWER VASCULAR LEFT POSTERIOR TIBIAL COMPRESS: NORMAL
BH CV LOWER VASCULAR LEFT PROXIMAL FEMORAL COMPRESS: NORMAL
BH CV LOWER VASCULAR LEFT SAPHENOFEMORAL JUNCTION AUGMENT: NORMAL
BH CV LOWER VASCULAR LEFT SAPHENOFEMORAL JUNCTION COMPRESS: NORMAL
BH CV LOWER VASCULAR LEFT SAPHENOFEMORAL JUNCTION PHASIC: NORMAL
BH CV LOWER VASCULAR LEFT SAPHENOFEMORAL JUNCTION SPONT: NORMAL
BH CV LOWER VASCULAR RIGHT COMMON FEMORAL AUGMENT: NORMAL
BH CV LOWER VASCULAR RIGHT COMMON FEMORAL COMPRESS: NORMAL
BH CV LOWER VASCULAR RIGHT COMMON FEMORAL PHASIC: NORMAL
BH CV LOWER VASCULAR RIGHT COMMON FEMORAL SPONT: NORMAL
BILIRUB SERPL-MCNC: 0.4 MG/DL (ref 0.3–1.2)
BNP SERPL-MCNC: 171 PG/ML (ref 0–100)
BUN BLD-MCNC: 35 MG/DL (ref 9–23)
BUN/CREAT SERPL: 25 (ref 7–25)
CALCIUM SPEC-SCNC: 9.4 MG/DL (ref 8.7–10.4)
CHLORIDE SERPL-SCNC: 106 MMOL/L (ref 99–109)
CO2 SERPL-SCNC: 27 MMOL/L (ref 20–31)
CREAT BLD-MCNC: 1.4 MG/DL (ref 0.6–1.3)
D DIMER PPP FEU-MCNC: 1.25 MG/L (FEU) (ref 0–0.5)
DEPRECATED RDW RBC AUTO: 48.9 FL (ref 37–54)
EOSINOPHIL # BLD AUTO: 0.08 10*3/MM3 (ref 0.1–0.3)
EOSINOPHIL NFR BLD AUTO: 1.5 % (ref 0–3)
ERYTHROCYTE [DISTWIDTH] IN BLOOD BY AUTOMATED COUNT: 13.7 % (ref 11.3–14.5)
GFR SERPL CREATININE-BSD FRML MDRD: 35 ML/MIN/1.73
GLOBULIN UR ELPH-MCNC: 3.4 GM/DL
GLUCOSE BLD-MCNC: 73 MG/DL (ref 70–100)
HCT VFR BLD AUTO: 37.3 % (ref 34.5–44)
HGB BLD-MCNC: 11.9 G/DL (ref 11.5–15.5)
HOLD SPECIMEN: NORMAL
IMM GRANULOCYTES # BLD: 0.02 10*3/MM3 (ref 0–0.03)
IMM GRANULOCYTES NFR BLD: 0.4 % (ref 0–0.6)
LYMPHOCYTES # BLD AUTO: 2.38 10*3/MM3 (ref 0.6–4.8)
LYMPHOCYTES NFR BLD AUTO: 46 % (ref 24–44)
MCH RBC QN AUTO: 31.2 PG (ref 27–31)
MCHC RBC AUTO-ENTMCNC: 31.9 G/DL (ref 32–36)
MCV RBC AUTO: 97.6 FL (ref 80–99)
MONOCYTES # BLD AUTO: 0.51 10*3/MM3 (ref 0–1)
MONOCYTES NFR BLD AUTO: 9.9 % (ref 0–12)
NEUTROPHILS # BLD AUTO: 2.15 10*3/MM3 (ref 1.5–8.3)
NEUTROPHILS NFR BLD AUTO: 41.6 % (ref 41–71)
PLATELET # BLD AUTO: 72 10*3/MM3 (ref 150–450)
PMV BLD AUTO: 11.5 FL (ref 6–12)
POTASSIUM BLD-SCNC: 5 MMOL/L (ref 3.5–5.5)
PROT SERPL-MCNC: 7.1 G/DL (ref 5.7–8.2)
RBC # BLD AUTO: 3.82 10*6/MM3 (ref 3.89–5.14)
SODIUM BLD-SCNC: 140 MMOL/L (ref 132–146)
TROPONIN I SERPL-MCNC: 0 NG/ML (ref 0–0.07)
TROPONIN I SERPL-MCNC: <0.006 NG/ML
WBC NRBC COR # BLD: 5.17 10*3/MM3 (ref 3.5–10.8)
WHOLE BLOOD HOLD SPECIMEN: NORMAL
WHOLE BLOOD HOLD SPECIMEN: NORMAL

## 2017-04-17 PROCEDURE — 93971 EXTREMITY STUDY: CPT | Performed by: INTERNAL MEDICINE

## 2017-04-17 PROCEDURE — 84484 ASSAY OF TROPONIN QUANT: CPT | Performed by: EMERGENCY MEDICINE

## 2017-04-17 PROCEDURE — 93005 ELECTROCARDIOGRAM TRACING: CPT | Performed by: EMERGENCY MEDICINE

## 2017-04-17 PROCEDURE — 25010000003 POTASSIUM CHLORIDE 10 MEQ/100ML SOLUTION: Performed by: EMERGENCY MEDICINE

## 2017-04-17 PROCEDURE — A9540 TC99M MAA: HCPCS | Performed by: EMERGENCY MEDICINE

## 2017-04-17 PROCEDURE — 93005 ELECTROCARDIOGRAM TRACING: CPT

## 2017-04-17 PROCEDURE — 85379 FIBRIN DEGRADATION QUANT: CPT | Performed by: EMERGENCY MEDICINE

## 2017-04-17 PROCEDURE — 83880 ASSAY OF NATRIURETIC PEPTIDE: CPT | Performed by: EMERGENCY MEDICINE

## 2017-04-17 PROCEDURE — A9558 XE133 XENON 10MCI: HCPCS | Performed by: EMERGENCY MEDICINE

## 2017-04-17 PROCEDURE — 80053 COMPREHEN METABOLIC PANEL: CPT | Performed by: EMERGENCY MEDICINE

## 2017-04-17 PROCEDURE — 0 XENON XE 133: Performed by: EMERGENCY MEDICINE

## 2017-04-17 PROCEDURE — 0 TECHNETIUM ALBUMIN AGGREGATED: Performed by: EMERGENCY MEDICINE

## 2017-04-17 PROCEDURE — 78582 LUNG VENTILAT&PERFUS IMAGING: CPT

## 2017-04-17 PROCEDURE — 96365 THER/PROPH/DIAG IV INF INIT: CPT

## 2017-04-17 PROCEDURE — 85025 COMPLETE CBC W/AUTO DIFF WBC: CPT | Performed by: EMERGENCY MEDICINE

## 2017-04-17 PROCEDURE — 71010 HC CHEST PA OR AP: CPT

## 2017-04-17 PROCEDURE — 93971 EXTREMITY STUDY: CPT

## 2017-04-17 PROCEDURE — 84484 ASSAY OF TROPONIN QUANT: CPT

## 2017-04-17 PROCEDURE — 99285 EMERGENCY DEPT VISIT HI MDM: CPT

## 2017-04-17 RX ORDER — BISACODYL 10 MG
10 SUPPOSITORY, RECTAL RECTAL DAILY PRN
COMMUNITY

## 2017-04-17 RX ORDER — TRAMADOL HYDROCHLORIDE 50 MG/1
50 TABLET ORAL EVERY 6 HOURS PRN
COMMUNITY
End: 2017-08-15

## 2017-04-17 RX ORDER — ALBUTEROL SULFATE 1.25 MG/3ML
1 SOLUTION RESPIRATORY (INHALATION) EVERY 6 HOURS PRN
COMMUNITY
End: 2017-08-15

## 2017-04-17 RX ORDER — POTASSIUM CHLORIDE 7.45 MG/ML
10 INJECTION INTRAVENOUS ONCE
Status: COMPLETED | OUTPATIENT
Start: 2017-04-17 | End: 2017-04-18

## 2017-04-17 RX ORDER — ACETAMINOPHEN 500 MG
1000 TABLET ORAL EVERY 4 HOURS PRN
COMMUNITY

## 2017-04-17 RX ORDER — ONDANSETRON 4 MG/1
4 TABLET, FILM COATED ORAL EVERY 6 HOURS PRN
COMMUNITY

## 2017-04-17 RX ORDER — CLONIDINE HYDROCHLORIDE 0.1 MG/1
0.1 TABLET ORAL EVERY 6 HOURS PRN
COMMUNITY
End: 2017-08-15

## 2017-04-17 RX ORDER — ACETAMINOPHEN 325 MG/1
650 TABLET ORAL EVERY 6 HOURS PRN
COMMUNITY
End: 2017-08-15

## 2017-04-17 RX ORDER — DOXYCYCLINE HYCLATE 100 MG/1
100 CAPSULE ORAL 2 TIMES DAILY
COMMUNITY
End: 2017-08-15

## 2017-04-17 RX ORDER — ALBUTEROL SULFATE 1.25 MG/3ML
1 SOLUTION RESPIRATORY (INHALATION)
COMMUNITY
End: 2017-08-15

## 2017-04-17 RX ORDER — SENNOSIDES 8.6 MG
17.2 CAPSULE ORAL 2 TIMES DAILY
COMMUNITY
End: 2017-08-15

## 2017-04-17 RX ORDER — CLONAZEPAM 1 MG/1
1 TABLET ORAL ONCE
Status: COMPLETED | OUTPATIENT
Start: 2017-04-17 | End: 2017-04-17

## 2017-04-17 RX ORDER — LOSARTAN POTASSIUM 50 MG/1
50 TABLET ORAL DAILY
COMMUNITY
End: 2017-08-15

## 2017-04-17 RX ORDER — MAGNESIUM HYDROXIDE/ALUMINUM HYDROXICE/SIMETHICONE 120; 1200; 1200 MG/30ML; MG/30ML; MG/30ML
30 SUSPENSION ORAL EVERY 4 HOURS PRN
COMMUNITY
End: 2017-08-15

## 2017-04-17 RX ORDER — TRAMADOL HYDROCHLORIDE 50 MG/1
50 TABLET ORAL 4 TIMES DAILY
COMMUNITY
End: 2017-08-15

## 2017-04-17 RX ORDER — DIPHENHYDRAMINE HCL 25 MG
25 CAPSULE ORAL NIGHTLY PRN
COMMUNITY
End: 2017-08-15

## 2017-04-17 RX ORDER — ALBUTEROL SULFATE 2.5 MG/3ML
2.5 SOLUTION RESPIRATORY (INHALATION)
COMMUNITY
End: 2017-08-15

## 2017-04-17 RX ORDER — DIPHENHYDRAMINE HCL 50 MG
50 CAPSULE ORAL NIGHTLY PRN
COMMUNITY
End: 2017-08-15

## 2017-04-17 RX ORDER — ROPINIROLE 1 MG/1
1 TABLET, FILM COATED ORAL NIGHTLY
COMMUNITY
End: 2017-08-15

## 2017-04-17 RX ORDER — HEPARIN SODIUM 5000 [USP'U]/ML
5000 INJECTION, SOLUTION INTRAVENOUS; SUBCUTANEOUS EVERY 12 HOURS SCHEDULED
COMMUNITY
End: 2017-08-15

## 2017-04-17 RX ORDER — NITROGLYCERIN 0.4 MG/1
0.4 TABLET SUBLINGUAL
COMMUNITY

## 2017-04-17 RX ORDER — CLONAZEPAM 1 MG/1
1 TABLET ORAL NIGHTLY PRN
COMMUNITY
End: 2017-08-15

## 2017-04-17 RX ORDER — DOCUSATE SODIUM 100 MG/1
100 CAPSULE, LIQUID FILLED ORAL 2 TIMES DAILY
COMMUNITY

## 2017-04-17 RX ORDER — TRAMADOL HYDROCHLORIDE 50 MG/1
50 TABLET ORAL NIGHTLY PRN
COMMUNITY
End: 2017-08-15

## 2017-04-17 RX ORDER — DIPHENHYDRAMINE HCL 25 MG
25 CAPSULE ORAL EVERY 6 HOURS PRN
COMMUNITY
End: 2017-08-15

## 2017-04-17 RX ORDER — SODIUM CHLORIDE 0.9 % (FLUSH) 0.9 %
10 SYRINGE (ML) INJECTION AS NEEDED
Status: DISCONTINUED | OUTPATIENT
Start: 2017-04-17 | End: 2017-04-18 | Stop reason: HOSPADM

## 2017-04-17 RX ADMIN — Medication 10.57 MILLICURIE: at 21:19

## 2017-04-17 RX ADMIN — CLONAZEPAM 1 MG: 1 TABLET ORAL at 23:40

## 2017-04-17 RX ADMIN — POTASSIUM CHLORIDE 10 MEQ: 10 INJECTION, SOLUTION INTRAVENOUS at 23:45

## 2017-04-17 RX ADMIN — Medication 1 DOSE: at 21:28

## 2017-04-17 RX ADMIN — Medication 10 ML: at 16:53

## 2017-04-17 NOTE — ED PROVIDER NOTES
Subjective   HPI Comments: Meche Duarte is a 89 y.o.female who presents to the ED with c/o SOA. The patient has been a resident at [facility] for the last 10 days due to dehydration, UTI, PNA, and ALMITA. The patient has a history of falling and has been working with a physical therapist at [facility] to increase her strength. The nurse at [facility] reports the patient began experiencing SOA following physical therapy this afternoon, presenting into the ED for evaluation. Per the nurse at [facility], the patients O2 sat was 80% on room air. In the ED the patient O2 sat is 100% on 2L. The patient reports she was unable to sleep last night due to SOA. She denies any other acute complaints at this time.      Patient is a 89 y.o. female presenting with shortness of breath.   History provided by:  Patient ([facility] nurse)  Shortness of Breath   Severity:  Moderate  Onset quality:  Sudden  Timing:  Constant  Progression:  Unchanged  Chronicity:  New  Context comment:  Began last night, worsened after PT today  Relieved by:  Oxygen  Worsened by:  Exertion and activity  Associated symptoms: no abdominal pain, no chest pain, no cough, no fever and no vomiting        Review of Systems   Constitutional: Negative for fever.   Respiratory: Positive for shortness of breath. Negative for cough.    Cardiovascular: Negative for chest pain.   Gastrointestinal: Negative for abdominal pain and vomiting.   All other systems reviewed and are negative.      Past Medical History:   Diagnosis Date   • Asthma    • CHF (congestive heart failure)    • Falls frequently    • GERD (gastroesophageal reflux disease)    • [place] (hard of hearing)    • Hypertension    • Parkinson's disease    • Restless leg syndrome    • Shingles     Right Eye       Allergies   Allergen Reactions   • Asa [Aspirin]        Past Surgical History:   Procedure Laterality Date   • HYSTERECTOMY     • JOINT REPLACEMENT      Bilateral knee replacements   • OTHER  SURGICAL HISTORY      pins in Right wrist from fall       History reviewed. No pertinent family history.    Social History     Social History   • Marital status:      Spouse name: N/A   • Number of children: N/A   • Years of education: N/A     Social History Main Topics   • Smoking status: Never Smoker   • Smokeless tobacco: None   • Alcohol use No   • Drug use: None   • Sexual activity: Not Asked     Other Topics Concern   • None     Social History Narrative         Objective   Physical Exam   Constitutional: No distress.   HENT:   Head: Normocephalic and atraumatic.   Eyes: Conjunctivae are normal. No scleral icterus.   Neck: Normal range of motion. Neck supple.   Cardiovascular: Normal rate, regular rhythm and normal heart sounds.    Pulmonary/Chest: Effort normal and breath sounds normal. No respiratory distress.   Abdominal: Soft. Bowel sounds are normal. There is no tenderness.   Musculoskeletal: Normal range of motion. She exhibits tenderness.   Tenderness of left calf, which is warm to touch, however no erythema present.   Neurological: She is alert.   Skin: Skin is warm and dry. She is not diaphoretic. No erythema.   Nursing note and vitals reviewed.      Procedures         ED Course  ED Course   Comment By Time   I discussed her Doppler ultrasound with the tech who performed it and she advises me that it is negative for DVT Freddie Matute MD 04/17 2821   I spoke with Mrs. wilkes and her nephew.  She remains comfortable with saturations of 97% on room air.  Will obtain VQ scan to evaluate for PE.  I took her some dinner. Freddie Matute MD 04/17 7112   Mrs. wilkes has different family members present.  They tell me they were with her [employer] when she had the hypoxic episode.  They report to me that she is just not been sleeping well and that she had vigorous physical therapy and just seemed to be exhausted when she returned to her room.  I advised them that her oxygen saturations of been very good  since she's been here and we have ruled out a heart attack, congestive heart failure, pneumonia and are in the process of ruling out a PE. Freddie Matute MD 04/17 2107   We are awaiting her V/Q scan.  Mrs. wilkes is getting more agitated and restless.  Her daughter asked that we give her medicine to help her relax.  She takes Klonopin at night which I have ordered her daughters have already given her a tramadol. Freddie Matute MD 04/17 3184   On our monitor the VQ scan indicates it's not been read.  I called the x-ray tech and she was able to find the results and it shows no evidence of pulmonary embolism. Freddie Matute MD 04/17 4167     No results found for this or any previous visit (from the past 24 hour(s)).  Note: In addition to lab results from this visit, the labs listed above may include labs taken at another facility or during a different encounter within the last 24 hours. Please correlate lab times with ED admission and discharge times for further clarification of the services performed during this visit.    XR Chest 1 View   Final Result   No acute cardiopulmonary disease.       D:  04/17/2017   E:  04/17/2017       This report was finalized on 4/19/2017 11:05 AM by Dr. Sade Castrejon MD.          NM Lung Ventilation Perfusion   Final Result   1. Low probability for pulmonary embolus.   2. PE absent.   3. Substantial third phase ventilation abnormality suggesting   significant basilar COPD with delayed basilar tracer washout bilaterally   and extensively.   3. Perfusion abnormality not identified.        D:  04/18/2017   E:  04/18/2017       This report was finalized on 4/18/2017 12:01 PM by Dr. Jerry Du MD.            Vitals:    04/17/17 2300 04/18/17 0000 04/18/17 0115 04/18/17 0210   BP: 166/72 140/57  148/84   Patient Position:       Pulse: 65 73 73 74   Resp:    20   Temp:       TempSrc:       SpO2: 97% 96% 95% 96%   Weight:       Height:         Medications   xenon xe 133 gas 10 mCi  10.57 millicurie (10.57 millicuries Inhalation Given 4/17/17 2119)   technetium albumin aggregated (MAA) solution 1 dose (1 dose Intravenous Given 4/17/17 2128)   clonazePAM (KlonoPIN) tablet 1 mg (1 mg Oral Given 4/17/17 2340)   potassium chloride 10 mEq in 100 mL IVPB (0 mEq Intravenous Stopped 4/18/17 0100)     ECG/EMG Results (last 24 hours)     Procedure Component Value Units Date/Time    ECG 12 Lead [[id number]] Collected:  04/17/17 1509     Updated:  04/17/17 1608                  MDM  Number of Diagnoses or Management Options  Generalized weakness: new and requires workup  Hypoxia: new and does not require workup     Amount and/or Complexity of Data Reviewed  Clinical lab tests: ordered and reviewed  Tests in the radiology section of CPT®: ordered and reviewed  Obtain history from someone other than the patient: yes  Review and summarize past medical records: yes  Independent visualization of images, tracings, or specimens: yes    Patient Progress  Patient progress: stable      Final diagnoses:   Hypoxia   Generalized weakness       Documentation assistance provided by oziel Quiroga.  Information recorded by the scribe was done at my direction and has been verified and validated by me.     Cinda Quiroga  04/17/17 1601       Cinda Quiroga  04/17/17 1738       Freddie Matute MD  04/21/17 1038

## 2017-04-18 VITALS
OXYGEN SATURATION: 96 % | HEART RATE: 74 BPM | SYSTOLIC BLOOD PRESSURE: 148 MMHG | WEIGHT: 165 LBS | TEMPERATURE: 98.1 F | RESPIRATION RATE: 20 BRPM | HEIGHT: 64 IN | DIASTOLIC BLOOD PRESSURE: 84 MMHG | BODY MASS INDEX: 28.17 KG/M2

## 2017-05-16 ENCOUNTER — APPOINTMENT (OUTPATIENT)
Dept: GENERAL RADIOLOGY | Facility: HOSPITAL | Age: 82
End: 2017-05-16

## 2017-05-16 ENCOUNTER — HOSPITAL ENCOUNTER (EMERGENCY)
Facility: HOSPITAL | Age: 82
Discharge: NURSING, REHAB OR LONG-TERM CARE | End: 2017-05-16
Attending: EMERGENCY MEDICINE | Admitting: EMERGENCY MEDICINE

## 2017-05-16 ENCOUNTER — APPOINTMENT (OUTPATIENT)
Dept: CT IMAGING | Facility: HOSPITAL | Age: 82
End: 2017-05-16

## 2017-05-16 VITALS
WEIGHT: 165 LBS | OXYGEN SATURATION: 94 % | RESPIRATION RATE: 16 BRPM | TEMPERATURE: 97.6 F | BODY MASS INDEX: 26.52 KG/M2 | HEIGHT: 66 IN | DIASTOLIC BLOOD PRESSURE: 92 MMHG | SYSTOLIC BLOOD PRESSURE: 116 MMHG | HEART RATE: 60 BPM

## 2017-05-16 DIAGNOSIS — S70.02XA CONTUSION OF LEFT HIP, INITIAL ENCOUNTER: Primary | ICD-10-CM

## 2017-05-16 DIAGNOSIS — E07.9 THYROID LESION: ICD-10-CM

## 2017-05-16 DIAGNOSIS — L89.90 DECUBITAL ULCER, UNSPECIFIED PRESSURE ULCER STAGE: ICD-10-CM

## 2017-05-16 PROCEDURE — 72192 CT PELVIS W/O DYE: CPT

## 2017-05-16 PROCEDURE — 70450 CT HEAD/BRAIN W/O DYE: CPT

## 2017-05-16 PROCEDURE — 73523 X-RAY EXAM HIPS BI 5/> VIEWS: CPT

## 2017-05-16 PROCEDURE — 72125 CT NECK SPINE W/O DYE: CPT

## 2017-05-16 PROCEDURE — 99284 EMERGENCY DEPT VISIT MOD MDM: CPT

## 2017-07-14 ENCOUNTER — HOSPITAL ENCOUNTER (EMERGENCY)
Facility: HOSPITAL | Age: 82
Discharge: NURSING, REHAB OR LONG-TERM CARE | End: 2017-07-14
Attending: EMERGENCY MEDICINE | Admitting: EMERGENCY MEDICINE

## 2017-07-14 ENCOUNTER — APPOINTMENT (OUTPATIENT)
Dept: CT IMAGING | Facility: HOSPITAL | Age: 82
End: 2017-07-14

## 2017-07-14 ENCOUNTER — APPOINTMENT (OUTPATIENT)
Dept: GENERAL RADIOLOGY | Facility: HOSPITAL | Age: 82
End: 2017-07-14

## 2017-07-14 VITALS
BODY MASS INDEX: 24.14 KG/M2 | WEIGHT: 150.2 LBS | RESPIRATION RATE: 18 BRPM | HEART RATE: 78 BPM | HEIGHT: 66 IN | OXYGEN SATURATION: 98 % | SYSTOLIC BLOOD PRESSURE: 148 MMHG | TEMPERATURE: 97.4 F | DIASTOLIC BLOOD PRESSURE: 79 MMHG
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
## 2017-07-14 DIAGNOSIS — F02.818 DEMENTIA DUE TO PARKINSON'S DISEASE WITH BEHAVIORAL DISTURBANCE (HCC): ICD-10-CM

## 2017-07-14 DIAGNOSIS — R29.6 FREQUENT FALLS: Primary | ICD-10-CM

## 2017-07-14 DIAGNOSIS — G20 DEMENTIA DUE TO PARKINSON'S DISEASE WITH BEHAVIORAL DISTURBANCE (HCC): ICD-10-CM

## 2017-07-14 LAB
ALBUMIN SERPL-MCNC: 4.1 G/DL (ref 3.2–4.8)
ALBUMIN/GLOB SERPL: 1.2 G/DL (ref 1.5–2.5)
ALP SERPL-CCNC: 96 U/L (ref 25–100)
ALT SERPL W P-5'-P-CCNC: 9 U/L (ref 7–40)
ANION GAP SERPL CALCULATED.3IONS-SCNC: 9 MMOL/L (ref 3–11)
AST SERPL-CCNC: 34 U/L (ref 0–33)
BACTERIA UR QL AUTO: ABNORMAL /HPF
BASOPHILS # BLD AUTO: 0.04 10*3/MM3 (ref 0–0.2)
BASOPHILS NFR BLD AUTO: 0.6 % (ref 0–1)
BILIRUB SERPL-MCNC: 0.2 MG/DL (ref 0.3–1.2)
BILIRUB UR QL STRIP: NEGATIVE
BNP SERPL-MCNC: 171 PG/ML (ref 0–100)
BUN BLD-MCNC: 24 MG/DL (ref 9–23)
BUN/CREAT SERPL: 10.4 (ref 7–25)
CALCIUM SPEC-SCNC: 9.9 MG/DL (ref 8.7–10.4)
CHLORIDE SERPL-SCNC: 101 MMOL/L (ref 99–109)
CLARITY UR: ABNORMAL
CO2 SERPL-SCNC: 23 MMOL/L (ref 20–31)
COLOR UR: YELLOW
CREAT BLD-MCNC: 2.3 MG/DL (ref 0.6–1.3)
D-LACTATE SERPL-SCNC: 0.9 MMOL/L (ref 0.5–2)
DEPRECATED RDW RBC AUTO: 44.6 FL (ref 37–54)
EOSINOPHIL # BLD AUTO: 0.07 10*3/MM3 (ref 0–0.3)
EOSINOPHIL NFR BLD AUTO: 1.1 % (ref 0–3)
ERYTHROCYTE [DISTWIDTH] IN BLOOD BY AUTOMATED COUNT: 13.5 % (ref 11.3–14.5)
GFR SERPL CREATININE-BSD FRML MDRD: 20 ML/MIN/1.73
GLOBULIN UR ELPH-MCNC: 3.5 GM/DL
GLUCOSE BLD-MCNC: 80 MG/DL (ref 70–100)
GLUCOSE UR STRIP-MCNC: NEGATIVE MG/DL
HCT VFR BLD AUTO: 33.5 % (ref 34.5–44)
HGB BLD-MCNC: 10.7 G/DL (ref 11.5–15.5)
HGB UR QL STRIP.AUTO: NEGATIVE
HYALINE CASTS UR QL AUTO: ABNORMAL /LPF
IMM GRANULOCYTES # BLD: 0.01 10*3/MM3 (ref 0–0.03)
IMM GRANULOCYTES NFR BLD: 0.2 % (ref 0–0.6)
KETONES UR QL STRIP: NEGATIVE
LEUKOCYTE ESTERASE UR QL STRIP.AUTO: NEGATIVE
LIPASE SERPL-CCNC: 58 U/L (ref 6–51)
LYMPHOCYTES # BLD AUTO: 1.6 10*3/MM3 (ref 0.6–4.8)
LYMPHOCYTES NFR BLD AUTO: 24.4 % (ref 24–44)
MCH RBC QN AUTO: 29.2 PG (ref 27–31)
MCHC RBC AUTO-ENTMCNC: 31.9 G/DL (ref 32–36)
MCV RBC AUTO: 91.5 FL (ref 80–99)
MONOCYTES # BLD AUTO: 0.52 10*3/MM3 (ref 0–1)
MONOCYTES NFR BLD AUTO: 7.9 % (ref 0–12)
NEUTROPHILS # BLD AUTO: 4.33 10*3/MM3 (ref 1.5–8.3)
NEUTROPHILS NFR BLD AUTO: 65.8 % (ref 41–71)
NITRITE UR QL STRIP: NEGATIVE
PH UR STRIP.AUTO: 7 [PH] (ref 5–8)
PLATELET # BLD AUTO: 379 10*3/MM3 (ref 150–450)
PMV BLD AUTO: 10.3 FL (ref 6–12)
POTASSIUM BLD-SCNC: 6 MMOL/L (ref 3.5–5.5)
PROCALCITONIN SERPL-MCNC: 0.05 NG/ML
PROT SERPL-MCNC: 7.6 G/DL (ref 5.7–8.2)
PROT UR QL STRIP: NEGATIVE
RBC # BLD AUTO: 3.66 10*6/MM3 (ref 3.89–5.14)
RBC # UR: ABNORMAL /HPF
REF LAB TEST METHOD: ABNORMAL
SODIUM BLD-SCNC: 133 MMOL/L (ref 132–146)
SP GR UR STRIP: 1.01 (ref 1–1.03)
SQUAMOUS #/AREA URNS HPF: ABNORMAL /HPF
TROPONIN I SERPL-MCNC: 0 NG/ML (ref 0–0.07)
UROBILINOGEN UR QL STRIP: ABNORMAL
WBC NRBC COR # BLD: 6.57 10*3/MM3 (ref 3.5–10.8)
WBC UR QL AUTO: ABNORMAL /HPF

## 2017-07-14 PROCEDURE — 99284 EMERGENCY DEPT VISIT MOD MDM: CPT

## 2017-07-14 PROCEDURE — 71010 HC CHEST PA OR AP: CPT

## 2017-07-14 PROCEDURE — 70450 CT HEAD/BRAIN W/O DYE: CPT

## 2017-07-14 PROCEDURE — P9612 CATHETERIZE FOR URINE SPEC: HCPCS

## 2017-07-14 PROCEDURE — 96361 HYDRATE IV INFUSION ADD-ON: CPT

## 2017-07-14 PROCEDURE — 83690 ASSAY OF LIPASE: CPT | Performed by: EMERGENCY MEDICINE

## 2017-07-14 PROCEDURE — 93005 ELECTROCARDIOGRAM TRACING: CPT | Performed by: EMERGENCY MEDICINE

## 2017-07-14 PROCEDURE — 72125 CT NECK SPINE W/O DYE: CPT

## 2017-07-14 PROCEDURE — 85025 COMPLETE CBC W/AUTO DIFF WBC: CPT | Performed by: EMERGENCY MEDICINE

## 2017-07-14 PROCEDURE — 80053 COMPREHEN METABOLIC PANEL: CPT | Performed by: EMERGENCY MEDICINE

## 2017-07-14 PROCEDURE — 81001 URINALYSIS AUTO W/SCOPE: CPT | Performed by: EMERGENCY MEDICINE

## 2017-07-14 PROCEDURE — 87040 BLOOD CULTURE FOR BACTERIA: CPT | Performed by: EMERGENCY MEDICINE

## 2017-07-14 PROCEDURE — 83880 ASSAY OF NATRIURETIC PEPTIDE: CPT | Performed by: EMERGENCY MEDICINE

## 2017-07-14 PROCEDURE — 84484 ASSAY OF TROPONIN QUANT: CPT

## 2017-07-14 PROCEDURE — 96360 HYDRATION IV INFUSION INIT: CPT

## 2017-07-14 PROCEDURE — 84145 PROCALCITONIN (PCT): CPT | Performed by: EMERGENCY MEDICINE

## 2017-07-14 PROCEDURE — 83605 ASSAY OF LACTIC ACID: CPT | Performed by: EMERGENCY MEDICINE

## 2017-07-14 RX ORDER — OMEPRAZOLE 20 MG/1
20 CAPSULE, DELAYED RELEASE ORAL DAILY
COMMUNITY

## 2017-07-14 RX ORDER — IPRATROPIUM BROMIDE AND ALBUTEROL SULFATE 2.5; .5 MG/3ML; MG/3ML
3 SOLUTION RESPIRATORY (INHALATION) EVERY 4 HOURS PRN
COMMUNITY
End: 2017-08-15

## 2017-07-14 RX ORDER — SODIUM CHLORIDE 0.9 % (FLUSH) 0.9 %
10 SYRINGE (ML) INJECTION AS NEEDED
Status: DISCONTINUED | OUTPATIENT
Start: 2017-07-14 | End: 2017-07-15 | Stop reason: HOSPADM

## 2017-07-14 RX ORDER — ALPRAZOLAM 0.5 MG/1
0.5 TABLET ORAL 2 TIMES DAILY PRN
COMMUNITY
End: 2017-08-15

## 2017-07-14 RX ORDER — QUETIAPINE FUMARATE 25 MG/1
25 TABLET, FILM COATED ORAL NIGHTLY
COMMUNITY
End: 2017-08-15

## 2017-07-14 RX ORDER — SODIUM CHLORIDE 9 MG/ML
125 INJECTION, SOLUTION INTRAVENOUS CONTINUOUS
Status: DISCONTINUED | OUTPATIENT
Start: 2017-07-14 | End: 2017-07-14

## 2017-07-14 RX ADMIN — SODIUM CHLORIDE 500 ML: 9 INJECTION, SOLUTION INTRAVENOUS at 20:38

## 2017-07-14 RX ADMIN — SODIUM CHLORIDE: 9 INJECTION, SOLUTION INTRAVENOUS at 19:34

## 2017-07-14 NOTE — ED PROVIDER NOTES
Subjective   HPI Comments: The patient presents to the emergency department via EMS secondary to reported potential for altered mental status and falling out of her wheelchair.  EMS reports that the patient is at her neurologic baseline otherwise, but were told by [facility] that the patient has fallen out of her wheelchair several times.  The patient is able to answer the question of her name but is otherwise unable to provide any helpful information.  The patient does have a known history of late stage Parkinson's as well as frequent falls.  No other history is readily available.      History provided by:  EMS personnel and nursing home      Review of Systems   Unable to perform ROS: Dementia       Past Medical History:   Diagnosis Date   • Asthma    • CHF (congestive heart failure)    • Falls frequently    • GERD (gastroesophageal reflux disease)    • [place] (hard of hearing)    • Hypertension    • Parkinson's disease    • Restless leg syndrome    • Shingles     Right Eye       Allergies   Allergen Reactions   • Asa [Aspirin]        Past Surgical History:   Procedure Laterality Date   • HYSTERECTOMY     • JOINT REPLACEMENT      Bilateral knee replacements   • OTHER SURGICAL HISTORY      pins in Right wrist from fall       History reviewed. No pertinent family history.    Social History     Social History   • Marital status:      Spouse name: N/A   • Number of children: N/A   • Years of education: N/A     Social History Main Topics   • Smoking status: Never Smoker   • Smokeless tobacco: None   • Alcohol use No   • Drug use: Defer   • Sexual activity: Defer     Other Topics Concern   • None     Social History Narrative   • None           Objective   Physical Exam   Constitutional: She appears well-developed and well-nourished.   HENT:   Head: Atraumatic.   Cardiovascular: Normal rate, regular rhythm, normal heart sounds and intact distal pulses.    Pulmonary/Chest: Effort normal and breath sounds normal.    Abdominal: Soft. There is no tenderness.   Musculoskeletal:   Chronic contracture with generalized age related chronic issues.   Neurological: She is alert.   Patient is alert to her name but otherwise is not oriented.  Patient unable to provide any significant contributing to her current presentation and symptoms.   Skin: Skin is warm and dry.   Nursing note and vitals reviewed.      Procedures         ED Course  ED Course   Value Comment By Time   Creatinine: (!) 2.30 (Reviewed) Collins Subramanian MD 07/14 2020    The patient has no emergent findings here in the ER.  Imaging is unremarkable.  Labs are overall unremarkable no evidence of acute infectious process.  Differential on this patient or frequent falls could be age for Parkinson's related falls.  Also included on initial evaluation was the potential for traumatic injury and infectious process as the source of her falls.  This point there is no indication for further evaluation or admission.  We'll discharge the patient back to her care facility. Collins Subramanian MD 07/14 2149                  MDM  Number of Diagnoses or Management Options     Amount and/or Complexity of Data Reviewed  Clinical lab tests: reviewed  Tests in the radiology section of CPT®: reviewed  Decide to obtain previous medical records or to obtain history from someone other than the patient: yes  Obtain history from someone other than the patient: yes  Review and summarize past medical records: yes  Independent visualization of images, tracings, or specimens: yes        Final diagnoses:   Frequent falls   Fall from wheelchair, initial encounter   Dementia due to Parkinson's disease with behavioral disturbance            Collins Subramanian MD  07/14/17 1292

## 2017-07-15 NOTE — DISCHARGE INSTRUCTIONS
Follow up with one of the [facility] physician groups below to setup primary care. If you have trouble following up, please call Sheri Watts, our transitional care nurse, at [phone].    (Dr. Angela, Dr. Ray, Dr. Rene, and Dr. Bowers.)  [facility], Primary Care, [phone], [address], [place] [address]    [facility], Primary Care, [phone], [address], [address] [place], [address] [facility]) [facility], Primary Care, [phone], [address], [address] [place], [address] [facility], Primary Care, [phone], [address], [address] [place], [address]     [place] [facility], Primary Care, [phone], [address], [address] [place], [address]    [place] [address] [facility], Primary Care, [phone], [address]    [place], [address] [facility], Primary Care, [phone], [address], [address] [place], [address] [facility], Primary Care, [phone], [address], [address] [place], [address] [facility]) [facility], Primary Care, [phone], [address], [address] [place], [address] [facility], Primary Care, [phone], [address], [address] [place], [address] [facility], Primary Care, [phone], [address], [place], [address]     [facility], Primary Care, [phone], [address] [id number]

## 2017-07-19 LAB
BACTERIA SPEC AEROBE CULT: NORMAL
BACTERIA SPEC AEROBE CULT: NORMAL

## 2017-08-15 ENCOUNTER — HOSPITAL ENCOUNTER (INPATIENT)
Facility: HOSPITAL | Age: 82
LOS: 3 days | Discharge: NURSING, REHAB OR LONG-TERM CARE | End: 2017-08-18
Attending: EMERGENCY MEDICINE | Admitting: INTERNAL MEDICINE

## 2017-08-15 ENCOUNTER — APPOINTMENT (OUTPATIENT)
Dept: GENERAL RADIOLOGY | Facility: HOSPITAL | Age: 82
End: 2017-08-15

## 2017-08-15 ENCOUNTER — APPOINTMENT (OUTPATIENT)
Dept: CT IMAGING | Facility: HOSPITAL | Age: 82
End: 2017-08-15

## 2017-08-15 ENCOUNTER — APPOINTMENT (OUTPATIENT)
Dept: MRI IMAGING | Facility: HOSPITAL | Age: 82
End: 2017-08-15

## 2017-08-15 DIAGNOSIS — J69.0 ASPIRATION PNEUMONIA OF RIGHT LOWER LOBE, UNSPECIFIED ASPIRATION PNEUMONIA TYPE (HCC): Primary | ICD-10-CM

## 2017-08-15 DIAGNOSIS — R53.83 LETHARGY: ICD-10-CM

## 2017-08-15 LAB
ALBUMIN SERPL-MCNC: 3.4 G/DL (ref 3.2–4.8)
ALBUMIN/GLOB SERPL: 1.1 G/DL (ref 1.5–2.5)
ALP SERPL-CCNC: 101 U/L (ref 25–100)
ALT SERPL W P-5'-P-CCNC: 3 U/L (ref 7–40)
ANION GAP SERPL CALCULATED.3IONS-SCNC: 6 MMOL/L (ref 3–11)
APTT PPP: 32.2 SECONDS (ref 24–31)
ARTERIAL PATENCY WRIST A: ABNORMAL
AST SERPL-CCNC: 12 U/L (ref 0–33)
ATMOSPHERIC PRESS: 747 MMHG
BASE EXCESS BLDA CALC-SCNC: 0.8 MMOL/L (ref 0–2)
BASOPHILS # BLD AUTO: 0.02 10*3/MM3 (ref 0–0.2)
BASOPHILS NFR BLD AUTO: 0.3 % (ref 0–1)
BDY SITE: ABNORMAL
BILIRUB SERPL-MCNC: 0.3 MG/DL (ref 0.3–1.2)
BNP SERPL-MCNC: 185 PG/ML (ref 0–100)
BUN BLD-MCNC: 14 MG/DL (ref 9–23)
BUN/CREAT SERPL: 14 (ref 7–25)
CALCIUM SPEC-SCNC: 8.8 MG/DL (ref 8.7–10.4)
CHLORIDE SERPL-SCNC: 104 MMOL/L (ref 99–109)
CO2 BLDA-SCNC: 26.3 MMOL/L (ref 22–33)
CO2 SERPL-SCNC: 26 MMOL/L (ref 20–31)
COHGB MFR BLD: 1.4 % (ref 0–2)
CREAT BLD-MCNC: 1 MG/DL (ref 0.6–1.3)
D-LACTATE SERPL-SCNC: 1 MMOL/L (ref 0.5–2)
DEPRECATED RDW RBC AUTO: 47.9 FL (ref 37–54)
EOSINOPHIL # BLD AUTO: 0.1 10*3/MM3 (ref 0–0.3)
EOSINOPHIL NFR BLD AUTO: 1.5 % (ref 0–3)
ERYTHROCYTE [DISTWIDTH] IN BLOOD BY AUTOMATED COUNT: 14 % (ref 11.3–14.5)
GFR SERPL CREATININE-BSD FRML MDRD: 52 ML/MIN/1.73
GLOBULIN UR ELPH-MCNC: 3.1 GM/DL
GLUCOSE BLD-MCNC: 82 MG/DL (ref 70–100)
HCO3 BLDA-SCNC: 25.1 MMOL/L (ref 20–26)
HCT VFR BLD AUTO: 28.6 % (ref 34.5–44)
HCT VFR BLD CALC: 26.5 %
HGB BLD-MCNC: 9.1 G/DL (ref 11.5–15.5)
HGB BLDA-MCNC: 8.7 G/DL (ref 14–18)
HOLD SPECIMEN: NORMAL
HOLD SPECIMEN: NORMAL
HOROWITZ INDEX BLD+IHG-RTO: 28 %
IMM GRANULOCYTES # BLD: 0.03 10*3/MM3 (ref 0–0.03)
IMM GRANULOCYTES NFR BLD: 0.4 % (ref 0–0.6)
LYMPHOCYTES # BLD AUTO: 0.99 10*3/MM3 (ref 0.6–4.8)
LYMPHOCYTES NFR BLD AUTO: 14.4 % (ref 24–44)
MCH RBC QN AUTO: 29.8 PG (ref 27–31)
MCHC RBC AUTO-ENTMCNC: 31.8 G/DL (ref 32–36)
MCV RBC AUTO: 93.8 FL (ref 80–99)
METHGB BLD QL: 1 % (ref 0–1.5)
MODALITY: ABNORMAL
MONOCYTES # BLD AUTO: 0.72 10*3/MM3 (ref 0–1)
MONOCYTES NFR BLD AUTO: 10.5 % (ref 0–12)
NEUTROPHILS # BLD AUTO: 5.02 10*3/MM3 (ref 1.5–8.3)
NEUTROPHILS NFR BLD AUTO: 72.9 % (ref 41–71)
OXYHGB MFR BLDV: 94.7 % (ref 94–99)
PCO2 BLDA: 37.9 MM HG (ref 35–45)
PH BLDA: 7.43 PH UNITS (ref 7.35–7.45)
PHOSPHATE SERPL-MCNC: 3.4 MG/DL (ref 2.4–5.1)
PLATELET # BLD AUTO: 348 10*3/MM3 (ref 150–450)
PMV BLD AUTO: 9.5 FL (ref 6–12)
PO2 BLDA: 80.5 MM HG (ref 83–108)
POTASSIUM BLD-SCNC: 3.7 MMOL/L (ref 3.5–5.5)
PROT SERPL-MCNC: 6.5 G/DL (ref 5.7–8.2)
RBC # BLD AUTO: 3.05 10*6/MM3 (ref 3.89–5.14)
SODIUM BLD-SCNC: 136 MMOL/L (ref 132–146)
TROPONIN I SERPL-MCNC: 0 NG/ML (ref 0–0.07)
TROPONIN I SERPL-MCNC: 0 NG/ML (ref 0–0.07)
WBC NRBC COR # BLD: 6.88 10*3/MM3 (ref 3.5–10.8)
WHOLE BLOOD HOLD SPECIMEN: NORMAL
WHOLE BLOOD HOLD SPECIMEN: NORMAL

## 2017-08-15 PROCEDURE — 25010000002 PIPERACILLIN SOD-TAZOBACTAM PER 1 G: Performed by: EMERGENCY MEDICINE

## 2017-08-15 PROCEDURE — 71010 HC CHEST PA OR AP: CPT

## 2017-08-15 PROCEDURE — 82375 ASSAY CARBOXYHB QUANT: CPT | Performed by: EMERGENCY MEDICINE

## 2017-08-15 PROCEDURE — 84484 ASSAY OF TROPONIN QUANT: CPT

## 2017-08-15 PROCEDURE — 73502 X-RAY EXAM HIP UNI 2-3 VIEWS: CPT

## 2017-08-15 PROCEDURE — 84100 ASSAY OF PHOSPHORUS: CPT | Performed by: HOSPITALIST

## 2017-08-15 PROCEDURE — 36600 WITHDRAWAL OF ARTERIAL BLOOD: CPT | Performed by: EMERGENCY MEDICINE

## 2017-08-15 PROCEDURE — 83880 ASSAY OF NATRIURETIC PEPTIDE: CPT | Performed by: HOSPITALIST

## 2017-08-15 PROCEDURE — 87040 BLOOD CULTURE FOR BACTERIA: CPT | Performed by: EMERGENCY MEDICINE

## 2017-08-15 PROCEDURE — 99223 1ST HOSP IP/OBS HIGH 75: CPT | Performed by: HOSPITALIST

## 2017-08-15 PROCEDURE — 85730 THROMBOPLASTIN TIME PARTIAL: CPT | Performed by: EMERGENCY MEDICINE

## 2017-08-15 PROCEDURE — 25010000002 VANCOMYCIN HCL IN NACL 1.25-0.9 GM/250ML-% SOLUTION: Performed by: EMERGENCY MEDICINE

## 2017-08-15 PROCEDURE — 83605 ASSAY OF LACTIC ACID: CPT | Performed by: EMERGENCY MEDICINE

## 2017-08-15 PROCEDURE — 25010000002 HEPARIN (PORCINE) PER 1000 UNITS: Performed by: HOSPITALIST

## 2017-08-15 PROCEDURE — 99221 1ST HOSP IP/OBS SF/LOW 40: CPT | Performed by: NURSE PRACTITIONER

## 2017-08-15 PROCEDURE — 93005 ELECTROCARDIOGRAM TRACING: CPT | Performed by: EMERGENCY MEDICINE

## 2017-08-15 PROCEDURE — 82805 BLOOD GASES W/O2 SATURATION: CPT | Performed by: EMERGENCY MEDICINE

## 2017-08-15 PROCEDURE — 25810000003 DEXTROSE-NACL PER 500 ML: Performed by: HOSPITALIST

## 2017-08-15 PROCEDURE — 85025 COMPLETE CBC W/AUTO DIFF WBC: CPT | Performed by: EMERGENCY MEDICINE

## 2017-08-15 PROCEDURE — 80053 COMPREHEN METABOLIC PANEL: CPT | Performed by: EMERGENCY MEDICINE

## 2017-08-15 PROCEDURE — 70450 CT HEAD/BRAIN W/O DYE: CPT

## 2017-08-15 PROCEDURE — 99285 EMERGENCY DEPT VISIT HI MDM: CPT

## 2017-08-15 PROCEDURE — 73610 X-RAY EXAM OF ANKLE: CPT

## 2017-08-15 RX ORDER — CLONAZEPAM 0.5 MG/1
0.5 TABLET ORAL 3 TIMES DAILY
Status: ON HOLD | COMMUNITY
End: 2017-08-18

## 2017-08-15 RX ORDER — QUETIAPINE FUMARATE 50 MG/1
50 TABLET, FILM COATED ORAL NIGHTLY
COMMUNITY

## 2017-08-15 RX ORDER — SENNOSIDES 8.6 MG
2 TABLET ORAL 2 TIMES DAILY
COMMUNITY

## 2017-08-15 RX ORDER — SODIUM CHLORIDE 0.9 % (FLUSH) 0.9 %
1-10 SYRINGE (ML) INJECTION AS NEEDED
Status: DISCONTINUED | OUTPATIENT
Start: 2017-08-15 | End: 2017-08-18 | Stop reason: HOSPADM

## 2017-08-15 RX ORDER — COLCHICINE 0.6 MG/1
0.6 TABLET ORAL DAILY
COMMUNITY
End: 2017-08-15

## 2017-08-15 RX ORDER — ALLOPURINOL 100 MG/1
100 TABLET ORAL 2 TIMES DAILY
COMMUNITY

## 2017-08-15 RX ORDER — SODIUM CHLORIDE 0.9 % (FLUSH) 0.9 %
10 SYRINGE (ML) INJECTION AS NEEDED
Status: DISCONTINUED | OUTPATIENT
Start: 2017-08-15 | End: 2017-08-18 | Stop reason: HOSPADM

## 2017-08-15 RX ORDER — LIDOCAINE HYDROCHLORIDE AND EPINEPHRINE 10; 10 MG/ML; UG/ML
10 INJECTION, SOLUTION INFILTRATION; PERINEURAL ONCE
Status: COMPLETED | OUTPATIENT
Start: 2017-08-15 | End: 2017-08-15

## 2017-08-15 RX ORDER — PRAMIPEXOLE DIHYDROCHLORIDE 1 MG/1
1 TABLET ORAL NIGHTLY
COMMUNITY

## 2017-08-15 RX ORDER — HEPARIN SODIUM 5000 [USP'U]/ML
5000 INJECTION, SOLUTION INTRAVENOUS; SUBCUTANEOUS EVERY 8 HOURS SCHEDULED
Status: DISCONTINUED | OUTPATIENT
Start: 2017-08-15 | End: 2017-08-18 | Stop reason: HOSPADM

## 2017-08-15 RX ORDER — VANCOMYCIN HYDROCHLORIDE
20 ONCE
Status: COMPLETED | OUTPATIENT
Start: 2017-08-15 | End: 2017-08-15

## 2017-08-15 RX ORDER — DULOXETIN HYDROCHLORIDE 30 MG/1
30 CAPSULE, DELAYED RELEASE ORAL DAILY
COMMUNITY

## 2017-08-15 RX ORDER — DEXTROSE AND SODIUM CHLORIDE 5; .9 G/100ML; G/100ML
100 INJECTION, SOLUTION INTRAVENOUS CONTINUOUS
Status: DISCONTINUED | OUTPATIENT
Start: 2017-08-15 | End: 2017-08-18 | Stop reason: HOSPADM

## 2017-08-15 RX ADMIN — TAZOBACTAM SODIUM AND PIPERACILLIN SODIUM 4.5 G: 500; 4 INJECTION, SOLUTION INTRAVENOUS at 13:27

## 2017-08-15 RX ADMIN — VANCOMYCIN HYDROCHLORIDE 1250 MG: 1 INJECTION, POWDER, LYOPHILIZED, FOR SOLUTION INTRAVENOUS at 14:43

## 2017-08-15 RX ADMIN — DEXTROSE AND SODIUM CHLORIDE 100 ML/HR: 5; 900 INJECTION, SOLUTION INTRAVENOUS at 14:40

## 2017-08-15 RX ADMIN — Medication 10 ML: at 14:00

## 2017-08-15 RX ADMIN — HEPARIN SODIUM 5000 UNITS: 5000 INJECTION, SOLUTION INTRAVENOUS; SUBCUTANEOUS at 21:11

## 2017-08-15 RX ADMIN — LIDOCAINE HYDROCHLORIDE,EPINEPHRINE BITARTRATE 10 ML: 10; .01 INJECTION, SOLUTION INFILTRATION; PERINEURAL at 11:45

## 2017-08-15 NOTE — ED PROVIDER NOTES
"Subjective   HPI Comments: Ms. Meche Duarte is a 89 year old female with baseline dementia who presents to the ED with c/o AMS. EMS personnel state that the patient was \"walking and moving\" normally this morning until she was found at 0845 in her bedroom on the ground after a possible fall. Nursing home staff report that the fall produced a large laceration on her left ankle. EMS personnel report that the patient has since been almost completely unresponsive to physical or verbal prompts, and that her AMS has been worsening. The patient's last known normal time was 0800 at breakfast. The patient's daughter denies any recent fever or chills, but did not a cough for the past few days.    Patient is a 89 y.o. female presenting with altered mental status.   History provided by:  EMS personnel and relative  History limited by:  Mental status change  Altered Mental Status   Presenting symptoms: unresponsiveness    Severity:  Severe  Most recent episode:  Today  Episode history:  Continuous  Duration:  2 hours  Timing:  Constant  Progression:  Unchanged  Chronicity:  New  Context: dementia and nursing home resident    Associated symptoms: no fever        Review of Systems   Unable to perform ROS: Mental status change   Constitutional: Negative for chills and fever.   Respiratory: Positive for cough.    Skin: Positive for wound.       Past Medical History:   Diagnosis Date   • Asthma    • CHF (congestive heart failure)    • Falls frequently    • GERD (gastroesophageal reflux disease)    • [place] (hard of hearing)    • Hx of falling    • Hypertension    • Parkinson's disease    • Restless leg syndrome    • Shingles     Right Eye       Allergies   Allergen Reactions   • Asa [Aspirin] Shortness Of Breath       Past Surgical History:   Procedure Laterality Date   • HYSTERECTOMY     • JOINT REPLACEMENT      Bilateral knee replacements   • OTHER SURGICAL HISTORY      pins in Right wrist from fall       History reviewed. No pertinent " family history.    Social History     Social History   • Marital status:      Spouse name: N/A   • Number of children: N/A   • Years of education: N/A     Social History Main Topics   • Smoking status: Never Smoker   • Smokeless tobacco: None   • Alcohol use No   • Drug use: Defer   • Sexual activity: Defer     Other Topics Concern   • None     Social History Narrative   • None         Objective   Physical Exam   Constitutional: She appears well-developed and well-nourished. No distress.   HENT:   Head: Normocephalic and atraumatic.   Eyes: Conjunctivae are normal. No scleral icterus.   Neck: Normal range of motion. Neck supple.   Cardiovascular: Normal rate, regular rhythm and normal heart sounds.  Exam reveals no gallop and no friction rub.    No murmur heard.  Pulmonary/Chest: Effort normal. No respiratory distress. She has no wheezes. She has rhonchi (Right sided). She has no rales.   Wet, rattling cough.   Abdominal: Soft. Bowel sounds are normal. There is no tenderness. There is no guarding.   Musculoskeletal: Normal range of motion.   Skin: Skin is warm and dry. Laceration noted. She is not diaphoretic.   Large proximal pointing avulsion flap laceration approximately 4 inches long over anterior left ankle.    Nursing note and vitals reviewed.      Laceration Repair  Date/Time: 8/15/2017 11:52 AM  Performed by: ISAAC WU  Authorized by: ISAAC WU   Consent: Verbal consent obtained.  Consent given by: guardian  Imaging studies: imaging studies available  Required items: required blood products, implants, devices, and special equipment available  Body area: lower extremity  Location details: left ankle  Laceration length: 10.5 cm  Foreign bodies: no foreign bodies  Tendon involvement: none  Nerve involvement: none  Anesthesia: local infiltration    Anesthesia:  Local Anesthetic: lidocaine 1% with epinephrine    Sedation:  Patient sedated: no  Preparation: Patient was prepped and draped in the  usual sterile fashion.  Irrigation solution: saline  Irrigation method: syringe  Amount of cleaning: extensive  Debridement: none  Degree of undermining: none  Skin closure: 4-0 nylon  Number of sutures: 14  Technique: simple  Approximation: close  Approximation difficulty: complex  Dressing: 4x4 sterile gauze  Patient tolerance: Patient tolerated the procedure well with no immediate complications               ED Course  ED Course   Comment By Time   Reviewed CT with radiologist. CT reveals possible mass in right cerebellum. Does not represent an evolving infarction. -SCAR Knowlesew Fiona 08/15 1018   I spoke with Mrs. Garcia daughter to obtain additional history. Freddie Matute MD 08/15 1040   I have reviewed her chest x-ray and it shows a right lower lobe pneumonia.  I will order IV antibiotics and seek admission to the hospital.  I'm still awaiting labs.  I feel her abrupt mental status changes likely due to aspiration. Freddie Matute MD 08/15 1141   I sutured her leg.  I spoke with her daughter who advises me that her mother is to be considered DNR.  I have paged the hospitalist. Freddie Matute MD 08/15 1221   Discussed case with Dr. Jimenez who will admit. -SCAR Jaimes 08/15 1252   Mrs. wilkes is now an MRI and they're telling me that she is moving around and mildly combative.  I don't think that the results of that are going to significantly alter our plan.  I don't wish to sedate her.  We will cancel the MRI and possibly perform it when she is more awake and perhaps cooperative Freddie Matute MD 08/15 1253       Recent Results (from the past 24 hour(s))   CBC (No Diff)    Collection Time: 08/17/17 10:31 AM   Result Value Ref Range    WBC 7.13 3.50 - 10.80 10*3/mm3    RBC 3.04 (L) 3.89 - 5.14 10*6/mm3    Hemoglobin 8.8 (L) 11.5 - 15.5 g/dL    Hematocrit 28.0 (L) 34.5 - 44.0 %    MCV 92.1 80.0 - 99.0 fL    MCH 28.9 27.0 - 31.0 pg    MCHC 31.4 (L) 32.0 - 36.0 g/dL    RDW 13.7 11.3 - 14.5 %    RDW-SD 46.0 37.0 -  54.0 fl    MPV 9.3 6.0 - 12.0 fL    Platelets 409 150 - 450 10*3/mm3   Basic Metabolic Panel    Collection Time: 08/17/17 10:31 AM   Result Value Ref Range    Glucose 124 (H) 70 - 100 mg/dL    BUN 8 (L) 9 - 23 mg/dL    Creatinine 1.10 0.60 - 1.30 mg/dL    Sodium 143 132 - 146 mmol/L    Potassium 3.6 3.5 - 5.5 mmol/L    Chloride 111 (H) 99 - 109 mmol/L    CO2 25.0 20.0 - 31.0 mmol/L    Calcium 8.2 (L) 8.7 - 10.4 mg/dL    eGFR Non African Amer 47 (L) >60 mL/min/1.73    BUN/Creatinine Ratio 7.3 7.0 - 25.0    Anion Gap 7.0 3.0 - 11.0 mmol/L   Iron Profile    Collection Time: 08/17/17 10:31 AM   Result Value Ref Range    Iron 23 (L) 50 - 175 mcg/dL    TIBC 188 (L) 250 - 450 mcg/dL    Iron Saturation 12 (L) 15 - 50 %     Note: In addition to lab results from this visit, the labs listed above may include labs taken at another facility or during a different encounter within the last 24 hours. Please correlate lab times with ED admission and discharge times for further clarification of the services performed during this visit.    CT Head Without Contrast Stroke Protocol   Final Result   Low-density area in the region of the right cerebellar   peduncle, of questionable significance, but more prominent than on any   previous study. Consider brain MRI followup.       NOTE: The exam time is shown as 10:13. The study was reviewed on the CT   scan monitor and discussed with Dr. Matute at 10:15.       D:  08/15/2017   E:  08/15/2017               This report was finalized on 8/15/2017 9:42 PM by DR. Louie Long MD.          XR Hip With or Without Pelvis 2 - 3 View Left   Final Result   1. Cardiomegaly and mild pulmonary vascular congestion.    2. Suspected large hiatal hernia.   3. Patchy new right basilar disease, and small effusion.           AP pelvis and left lateral hip: The bones appear osteopenic, but grossly   intact. In particular, no left proximal femur fracture, acetabular or   pubic ramus fracture is appreciated. Hip  joint spaces are fairly   well-maintained. No advanced DJD is seen.       IMPRESSION: Diffuse osteopenia. No evidence of acute trauma of the   pelvis or left hip.           Left ankle: There appears to be an anterior skin laceration. No soft   tissue foreign body is identified. Smooth deformity of the distal fibula   appears to represent old healed spiral fracture. The ankle joint appears   anatomic and bony structures appear intact although osteopenic. Hip   joint space is well maintained.       IMPRESSION: Probably old spiral fracture of the fibula. No clearly acute   bony abnormality is seen. Anterior skin laceration noted.       D:  08/15/2017   E:  08/15/2017       This report was finalized on 8/15/2017 9:20 PM by DR. Louie Long MD.          XR Chest 1 View   Final Result   1. Cardiomegaly and mild pulmonary vascular congestion.    2. Suspected large hiatal hernia.   3. Patchy new right basilar disease, and small effusion.           AP pelvis and left lateral hip: The bones appear osteopenic, but grossly   intact. In particular, no left proximal femur fracture, acetabular or   pubic ramus fracture is appreciated. Hip joint spaces are fairly   well-maintained. No advanced DJD is seen.       IMPRESSION: Diffuse osteopenia. No evidence of acute trauma of the   pelvis or left hip.           Left ankle: There appears to be an anterior skin laceration. No soft   tissue foreign body is identified. Smooth deformity of the distal fibula   appears to represent old healed spiral fracture. The ankle joint appears   anatomic and bony structures appear intact although osteopenic. Hip   joint space is well maintained.       IMPRESSION: Probably old spiral fracture of the fibula. No clearly acute   bony abnormality is seen. Anterior skin laceration noted.       D:  08/15/2017   E:  08/15/2017       This report was finalized on 8/15/2017 9:20 PM by DR. Louie Long MD.          XR Ankle 3+ View Left   Final Result   1.  Cardiomegaly and mild pulmonary vascular congestion.    2. Suspected large hiatal hernia.   3. Patchy new right basilar disease, and small effusion.           AP pelvis and left lateral hip: The bones appear osteopenic, but grossly   intact. In particular, no left proximal femur fracture, acetabular or   pubic ramus fracture is appreciated. Hip joint spaces are fairly   well-maintained. No advanced DJD is seen.       IMPRESSION: Diffuse osteopenia. No evidence of acute trauma of the   pelvis or left hip.           Left ankle: There appears to be an anterior skin laceration. No soft   tissue foreign body is identified. Smooth deformity of the distal fibula   appears to represent old healed spiral fracture. The ankle joint appears   anatomic and bony structures appear intact although osteopenic. Hip   joint space is well maintained.       IMPRESSION: Probably old spiral fracture of the fibula. No clearly acute   bony abnormality is seen. Anterior skin laceration noted.       D:  08/15/2017   E:  08/15/2017       This report was finalized on 8/15/2017 9:20 PM by DR. Louie Long MD.            Vitals:    08/16/17 1955 08/17/17 0554 08/17/17 0909 08/17/17 1213   BP:  176/76  139/61   BP Location:  Right arm     Patient Position:  Lying     Pulse: 94 108 114 93   Resp: 16 16 18    Temp:  98.7 °F (37.1 °C)  97 °F (36.1 °C)   TempSrc:  Axillary     SpO2:    93%   Weight:       Height:         Medications   sodium chloride 0.9 % flush 10 mL (10 mL Intravenous Given 8/15/17 1400)   sodium chloride 0.9 % flush 1-10 mL (not administered)   heparin (porcine) 5000 UNIT/ML injection 5,000 Units (5,000 Units Subcutaneous Given 8/17/17 0602)   dextrose 5 % and sodium chloride 0.9 % infusion (0 mL/hr Intravenous Stopped 8/16/17 2100)   piperacillin-tazobactam (ZOSYN) 3.375 g in iso-osmotic dextrose 50 ml (premix) ( Intravenous Canceled Entry 8/17/17 0800)   pantoprazole (PROTONIX) injection 40 mg (40 mg Intravenous Given 8/17/17 0929)    acetaminophen (TYLENOL) tablet 1,000 mg (not administered)   allopurinol (ZYLOPRIM) tablet 100 mg (100 mg Oral Given 8/17/17 0929)   bisacodyl (DULCOLAX) suppository 10 mg (not administered)   carbidopa-levodopa (SINEMET)  MG per tablet 2 tablet (2 tablets Oral Given 8/17/17 0929)   polyvinyl alcohol (LIQUIFILM) 1.4 % ophthalmic solution 1 drop (not administered)   clonazePAM (KlonoPIN) tablet 0.5 mg (0.5 mg Oral Given 8/17/17 0929)   docusate sodium (COLACE) capsule 100 mg (100 mg Oral Given 8/17/17 0930)   DULoxetine (CYMBALTA) DR capsule 30 mg (30 mg Oral Given 8/17/17 0929)   budesonide-formoterol (SYMBICORT) 80-4.5 MCG/ACT inhaler 2 puff (2 puffs Inhalation Given 8/17/17 0909)   ondansetron (ZOFRAN) tablet 4 mg (not administered)   polyethylene glycol (MIRALAX) powder 17 g (17 g Oral Given 8/17/17 0929)   pramipexole (MIRAPEX) tablet 1 mg (1 mg Oral Not Given 8/16/17 2105)   QUEtiapine (SEROquel) tablet 50 mg (25 mg Oral Given 8/16/17 2027)   LORazepam (ATIVAN) injection 0.25 mg (0.25 mg Intravenous Given 8/16/17 2137)   lidocaine-EPINEPHrine (XYLOCAINE W/EPI) 1 %-1:330007 injection 10 mL (10 mL Injection Given by Other 8/15/17 1145)   vancomycin IVPB 1250 mg in 250 mL NS (premix) (1,250 mg Intravenous Handoff 8/15/17 1457)   piperacillin-tazobactam (ZOSYN) 4.5 g in iso-osmotic dextrose 100 mL IVPB (premix) (0 g Intravenous Stopped 8/15/17 1356)   haloperidol lactate (HALDOL) injection 1 mg (1 mg Intramuscular Given 8/16/17 1124)   LORazepam (ATIVAN) injection 0.5 mg (0.5 mg Intravenous Given 8/16/17 1525)   haloperidol lactate (HALDOL) injection 1 mg (1 mg Intramuscular Given 8/17/17 0005)   haloperidol lactate (HALDOL) injection 0.5 mg (0.5 mg Intravenous Given 8/17/17 1118)   sodium chloride 0.9 % bolus 250 mL (250 mL Intravenous New Bag 8/17/17 1113)     ECG/EMG Results (last 24 hours)     ** No results found for the last 24 hours. **                      MDM  Number of Diagnoses or Management  Options  new and requires workup  new and requires workup     Amount and/or Complexity of Data Reviewed  Clinical lab tests: ordered and reviewed  Tests in the radiology section of CPT®: ordered and reviewed  Obtain history from someone other than the patient: yes  Discuss the patient with other providers: yes  Independent visualization of images, tracings, or specimens: yes    Patient Progress  Patient progress: stable      Final diagnoses:   Aspiration pneumonia of right lower lobe, unspecified aspiration pneumonia type   Lethargy       Documentation assistance provided by oziel Jaimes.  Information recorded by the prernaibkae was done at my direction and has been verified and validated by me.     Vaughn Jaimes  08/15/17 1052       Vaughn Jaimes  08/15/17 1222       Vaughn Jaimes  08/15/17 1258       Freddei Matute MD  08/17/17 1252

## 2017-08-15 NOTE — CONSULTS
"NAME: HEATH DEAN  : 1927  PCP: No Known Provider  Attending MD: Freddie Matute MD    Date of Admission:  8/15/2017  Date of Service: 8/15/2017     CC: Altered Mental Status    History of Present Illness:  89 y.o.  female with baseline dementia who presents to the ED with c/o AMS. EMS personnel state that the patient was \"walking and moving\" normally this morning until she was found at 0845 in her bedroom on the ground after a possible fall. Nursing home staff report that the fall produced a large laceration on her left ankle. EMS personnel report that the patient has since been almost completely unresponsive to physical or verbal prompts, and that her AMS has been worsening. The patient's last known normal time was 0800 at breakfast. The patient's daughter denies any recent fever or chills, but did not a cough for the past few days.    Past Medical History:  Past Medical History:   Diagnosis Date   • Asthma    • CHF (congestive heart failure)    • Falls frequently    • GERD (gastroesophageal reflux disease)    • [place] (hard of hearing)    • Hx of falling    • Hypertension    • Parkinson's disease    • Restless leg syndrome    • Shingles     Right Eye       Past Surgical History:  Past Surgical History:   Procedure Laterality Date   • HYSTERECTOMY     • JOINT REPLACEMENT      Bilateral knee replacements   • OTHER SURGICAL HISTORY      pins in Right wrist from fall         Medications    (Not in a hospital admission)    Allergies:  Allergies   Allergen Reactions   • Asa [Aspirin]        Social Hx:  Social History     Social History   • Marital status:      Spouse name: N/A   • Number of children: N/A   • Years of education: N/A     Occupational History   • Not on file.     Social History Main Topics   • Smoking status: Never Smoker   • Smokeless tobacco: Not on file   • Alcohol use No   • Drug use: Defer   • Sexual activity: Defer     Other Topics Concern   • Not on file     Social History Narrative   • " No narrative on file       Family Hx:  History reviewed. No pertinent family history.    Review of Imaging:  CT of head without:  Low-density area in the region of the right cerebellar  peduncle, of questionable significance, but more prominent than on any  previous study. Consider brain MRI followup.    These findings were discussed with Radiology and Dr. Matute    Laboratory Result:  Lab Results   Component Value Date    WBC 6.88 08/15/2017    HGB 9.1 (L) 08/15/2017    HCT 28.6 (L) 08/15/2017    MCV 93.8 08/15/2017     08/15/2017     Lab Results   Component Value Date    GLUCOSE 82 08/15/2017    CALCIUM 8.8 08/15/2017     08/15/2017    K 3.7 08/15/2017    CO2 26.0 08/15/2017     08/15/2017    BUN 14 08/15/2017    CREATININE 1.00 08/15/2017    EGFRIFNONA 52 (L) 08/15/2017    BCR 14.0 08/15/2017    ANIONGAP 6.0 08/15/2017     No results found for: HGBA1C  No results found for: CHOL  No results found for: TRIG  No results found for: HDL  No results found for: LDLCALC  No results found for: LDL  No results found for: HDLLDLRATIO  No components found for: CHOLHDL    Physical Examination:  Vitals:    08/15/17 1401   BP: 124/54   Pulse: 62   Resp: 16   Temp:    SpO2: 98%        General Appearance:   Well developed, well nourished, well groomed, alert, and cooperative.  Cardiovascular: Regular rate and rhythm. No carotid bruits    Neurological examination:  Interval: baseline  1a. Level Of Consciousness: 0-->Alert: keenly responsive  1b. LOC Questions: 2-->Answers neither question correctly  1c. LOC Commands: 1-->Performs one task correctly  2. Best Gaze: 0-->Normal  3. Visual: 3-->Bilateral hemianopia (blind including cortical blindness)  4. Facial Palsy: 0-->Normal symmetrical movements  5a. Motor Arm, Left: 0-->No drift: limb holds 90 (or 45) degrees for full 10 secs  5b. Motor Arm, Right: 0-->No drift: limb holds 90 (or 45) degrees for full 10 secs  6a. Motor Leg, Left: 1-->Drift: leg falls by the  end of the 5-sec period but does not hit bed  6b. Motor Leg, Right: 1-->Drift: leg falls by the end of the 5-sec period but does not hit bed  7. Limb Ataxia: 0-->Absent (NOT ABLE TO UNDERSTAND)  8. Sensory: 0-->Normal: no sensory loss  9. Best Language: 0-->No aphasia: normal  10. Dysarthria: 0-->Normal  11. Extinction and Inattention (formerly Neglect): 0-->No abnormality    Total (NIH Stroke Scale): 8        Diagnoses/Plan:    Ms. Duarte is a 89 y.o. female who presents with AMS and this is most likely secondary to aspiration pneumonia not a neurologic event.  She is not a neurointerventional candidate therefore we will defer any treatment.

## 2017-08-15 NOTE — SIGNIFICANT NOTE
08/15/17 1045   SLP Deferred Reason   SLP Deferred Reason (Dysphagia order received. Pt nonresponsive per RN, requests hold and check back tomorrow. Thanks )

## 2017-08-15 NOTE — H&P
"Hospital Medicine History and Physical    Primary Care Physician:  [facility] at [facility]    Chief complaint:  AMS / Slumpped on Floor    History of Present Illness    89 year old female who presents with generalized weakness.  She was found slumped over at nursing home.  She worked with PT at NH and then on return to room patient was found slumped over on floor.    Patient's condition changed after breakfast this morning per Daughter.  MRI ordered in ER, but had to be cancelled.    She has not really walked in 1 year per daughter.  They use a lift to get her out of bed.  She is mostly in wheelchair or in bed.  Her lower extremities are swollen and she has had 2 knee surgeries.      Review of Systems   Otherwise complete ROS is negative except as mentioned in the HPI.    Past Medical History:   Past Medical History:   Diagnosis Date   • Asthma    • CHF (congestive heart failure)    • Falls frequently    • GERD (gastroesophageal reflux disease)    • [place] (hard of hearing)    • Hx of falling    • Hypertension    • Parkinson's disease    • Restless leg syndrome    • Shingles     Right Eye       Past Surgical History:  Past Surgical History:   Procedure Laterality Date   • HYSTERECTOMY     • JOINT REPLACEMENT      Bilateral knee replacements   • OTHER SURGICAL HISTORY      pins in Right wrist from fall       Family History:   Father - Cancer - possibly pancreatic  Mother - DM    Social History:  reports that she has never smoked. She does not have any smokeless tobacco history on file. Drug use questions deferred to the physician. She reports that she does not drink alcohol.    1 kid  12th grade level of education  Lives at [facility]    Medications:    (Not in a hospital admission)  Allergies   Allergen Reactions   • Asa [Aspirin]        Objective    Physical Exam:   Vital Signs: /54  Pulse 62  Temp 97.7 °F (36.5 °C) (Axillary)   Resp 16  Ht 65\" (165.1 cm)  Wt 145 lb (65.8 kg) "  SpO2 98%  BMI 24.13 kg/m2  Physical Exam  Temp:  [97.7 °F (36.5 °C)] 97.7 °F (36.5 °C)  Heart Rate:  [62-85] 62  Resp:  [16] 16  BP: (124-142)/(54-61) 124/54  Constitutional: weak phonation, weak muscle strength, bedbound and barely moving  Eyes: PERRLA, sclerae anicteric, no conjunctival injection  Neck: supple, no JVD  Respiratory: very weak inspiratory effort, diminished breath sounds, no wheezing  Cardiovascular: RRR, s1 and s2, + systolic murmur  Gastrointestinal: Positive bowel sounds, soft, nontender, nondistended  Musculoskeletal: edema in lower extremities bilaterally, laceration left ankle region, scars from knee surgeries  Psychiatric: confused  Neurologic: poor muscle strength and poor communicating ability make this impossible currently      Results Reviewed:  I have personally reviewed current lab, radiology, and data and agree.    Results from last 7 days  Lab Units 08/15/17  1110   WBC 10*3/mm3 6.88   HEMOGLOBIN g/dL 9.1*   PLATELETS 10*3/mm3 348       Results from last 7 days  Lab Units 08/15/17  1110   SODIUM mmol/L 136   POTASSIUM mmol/L 3.7   CO2 mmol/L 26.0   CREATININE mg/dL 1.00   GLUCOSE mg/dL 82   CALCIUM mg/dL 8.8           Assessment/Plan   Assessment & Plan  Active Problems:    Aspiration pneumonia of right lower lobe    89 year old female from [facility] who had PT this am and was found after eating to be slumped over with encephalopathy.   She appears to be not eating well and dehydrated.  Her daughter says she need lift to get out of bed and is mostly in bed or wheelchair.    Plan:    Encephalopathy  - likely due to poor oral intake and dehydration  - chronic small vessel white matter on previous MRI  - tried to get MRI done in ER today, but patient agitated and unable to be still, cancelled for now by ER physician  Possible Low Density Abnormality in Posterior Fossa  - tried to accomplish MRI in ER with no success today  - consider Neurology Evaluation if concerned about  changing events.  It sounds like little intervention wanted by patient based on discussion with Daughter in ER  Dementia  - unclear how severe this is  Possible Aspiration Pneumonia  - weak phonation and generalized muscle weakness  - aspiration precautions  - empiric treatment  Generalized Muscle Weakness also likely effecting muscles of respiration  - poor oral intake, glycogen reserves probably depleted  - likely candidate for recurrent pneumonia  - incentive spirometer exercises  LE Swelling  - immobility / swelling in legs  - LE Duplex - rule out DVT POA  Dehydration  - IV fluids    Daughter Confirmed DNR/DNI status in ER.  She says she would not want a feeding tube.    I discussed the patients findings and my recommendations with daughter in ED room 15    Eliu Mckay MD 08/15/17 2:10 PM

## 2017-08-15 NOTE — PROGRESS NOTES
Discharge Planning Assessment  [facility]     Patient Name: Meche Duarte  MRN: [id number]  Today's Date: 8/15/2017    Admit Date: 8/15/2017          Discharge Needs Assessment       08/15/17 1056    Living Environment    Lives With alone    Living Arrangements extended care facility   [facility]  [phone]    Transportation Available ambulance   [facility]    Living Environment    Provides Primary Care For no one, unable/limited ability to care for self    Quality Of Family Relationships supportive;helpful;involved    Able to Return to Prior Living Arrangements yes    Living Arrangement Comments [facility]  [phone]    Discharge Needs Assessment    Concerns To Be Addressed denies needs/concerns at this time    Readmission Within The Last 30 Days no previous admission in last 30 days    Equipment Currently Used at Home walker, rolling;wheelchair;lift device    Discharge Contact Information if Applicable [phone]            Discharge Plan       08/15/17 1058    Case Management/Social Work Plan    Plan [facility]     Patient/Family In Agreement With Plan yes    Additional Comments Spoke with daughter at . Pt is lives at [facility] [phone] in [place] Pt is dependent with ADL's and uses a wheel chair for mobility. Pt has no other DME or HH. Her medications are covered by insurance. Plan or discharge is to return to [facility] via [facility], when medically ready. Case Management will follow and assist with any discharge planning needs.        Discharge Placement     No information found                Demographic Summary       08/15/17 1052    Referral Information    Arrived From nursing facility   [facility] [phone]    Reason For Consult discharge planning    Contact Information    Permission Granted to Share Information With family/designee    Comments Daina Murdock (Daughter) Home Phone:  [phone]    Primary Care Physician Information    Name Amando            Functional Status       08/15/17 1054    Functional Status Prior    Ambulation 4-->completely dependent   Lift to wheel chair    Transferring 4-->completely dependent   Lift to wheel chair    Toileting 3-->assistive equipment and person    Bathing 4-->completely dependent    Dressing 4-->completely dependent    Eating 0-->independent    Communication 2-->difficulty understanding (not related to language barrier)   Hearing aids    Swallowing 0-->swallows foods/liquids without difficulty    IADL    Medications assistive person    Meal Preparation assistive person    Housekeeping completely dependent    Laundry completely dependent    Shopping completely dependent    Oral Care assistive person    Employment/Financial    Employment/Finance Comments Healthcare and medication coverage: Medicare and [employer]            Psychosocial     None            Abuse/Neglect     None            Legal     None            Substance Abuse     None            Patient Forms     None          Rossy Powers RN

## 2017-08-15 NOTE — PLAN OF CARE
Problem: Fall Risk (Adult)  Goal: Identify Related Risk Factors and Signs and Symptoms  Outcome: Outcome(s) achieved Date Met:  08/15/17    08/15/17 1817   Fall Risk   Fall Risk: Related Risk Factors age-related changes;confusion/agitation;gait/mobility problems;history of falls   Fall Risk: Signs and Symptoms presence of risk factors       Goal: Absence of Falls  Outcome: Ongoing (interventions implemented as appropriate)    08/15/17 1817   Fall Risk (Adult)   Absence of Falls making progress toward outcome

## 2017-08-16 ENCOUNTER — APPOINTMENT (OUTPATIENT)
Dept: CARDIOLOGY | Facility: HOSPITAL | Age: 82
End: 2017-08-16
Attending: HOSPITALIST

## 2017-08-16 LAB
ANION GAP SERPL CALCULATED.3IONS-SCNC: 8 MMOL/L (ref 3–11)
BUN BLD-MCNC: 11 MG/DL (ref 9–23)
BUN/CREAT SERPL: 11 (ref 7–25)
CALCIUM SPEC-SCNC: 8.5 MG/DL (ref 8.7–10.4)
CHLORIDE SERPL-SCNC: 107 MMOL/L (ref 99–109)
CO2 SERPL-SCNC: 24 MMOL/L (ref 20–31)
CREAT BLD-MCNC: 1 MG/DL (ref 0.6–1.3)
D-LACTATE SERPL-SCNC: 0.6 MMOL/L (ref 0.5–2)
DEPRECATED RDW RBC AUTO: 46.8 FL (ref 37–54)
ERYTHROCYTE [DISTWIDTH] IN BLOOD BY AUTOMATED COUNT: 13.6 % (ref 11.3–14.5)
GFR SERPL CREATININE-BSD FRML MDRD: 52 ML/MIN/1.73
GLUCOSE BLD-MCNC: 92 MG/DL (ref 70–100)
HCT VFR BLD AUTO: 25.5 % (ref 34.5–44)
HGB BLD-MCNC: 7.9 G/DL (ref 11.5–15.5)
MAGNESIUM SERPL-MCNC: 1.7 MG/DL (ref 1.3–2.7)
MCH RBC QN AUTO: 28.8 PG (ref 27–31)
MCHC RBC AUTO-ENTMCNC: 31 G/DL (ref 32–36)
MCV RBC AUTO: 93.1 FL (ref 80–99)
PHOSPHATE SERPL-MCNC: 3.6 MG/DL (ref 2.4–5.1)
PLATELET # BLD AUTO: 348 10*3/MM3 (ref 150–450)
PMV BLD AUTO: 9.5 FL (ref 6–12)
POTASSIUM BLD-SCNC: 3.9 MMOL/L (ref 3.5–5.5)
PROCALCITONIN SERPL-MCNC: 0.07 NG/ML
RBC # BLD AUTO: 2.74 10*6/MM3 (ref 3.89–5.14)
SODIUM BLD-SCNC: 139 MMOL/L (ref 132–146)
WBC NRBC COR # BLD: 6.5 10*3/MM3 (ref 3.5–10.8)

## 2017-08-16 PROCEDURE — 80048 BASIC METABOLIC PNL TOTAL CA: CPT | Performed by: HOSPITALIST

## 2017-08-16 PROCEDURE — 25010000002 HEPARIN (PORCINE) PER 1000 UNITS: Performed by: HOSPITALIST

## 2017-08-16 PROCEDURE — 94640 AIRWAY INHALATION TREATMENT: CPT

## 2017-08-16 PROCEDURE — 93970 EXTREMITY STUDY: CPT

## 2017-08-16 PROCEDURE — 84145 PROCALCITONIN (PCT): CPT | Performed by: HOSPITALIST

## 2017-08-16 PROCEDURE — 25010000002 HALOPERIDOL LACTATE PER 5 MG: Performed by: INTERNAL MEDICINE

## 2017-08-16 PROCEDURE — 99233 SBSQ HOSP IP/OBS HIGH 50: CPT | Performed by: INTERNAL MEDICINE

## 2017-08-16 PROCEDURE — 25810000003 DEXTROSE-NACL PER 500 ML: Performed by: HOSPITALIST

## 2017-08-16 PROCEDURE — 83605 ASSAY OF LACTIC ACID: CPT | Performed by: HOSPITALIST

## 2017-08-16 PROCEDURE — 83735 ASSAY OF MAGNESIUM: CPT | Performed by: HOSPITALIST

## 2017-08-16 PROCEDURE — 25010000002 PIPERACILLIN SOD-TAZOBACTAM PER 1 G: Performed by: HOSPITALIST

## 2017-08-16 PROCEDURE — 84100 ASSAY OF PHOSPHORUS: CPT | Performed by: HOSPITALIST

## 2017-08-16 PROCEDURE — 85027 COMPLETE CBC AUTOMATED: CPT | Performed by: HOSPITALIST

## 2017-08-16 PROCEDURE — 25010000002 LORAZEPAM PER 2 MG: Performed by: INTERNAL MEDICINE

## 2017-08-16 PROCEDURE — 25010000002 LORAZEPAM PER 2 MG: Performed by: NURSE PRACTITIONER

## 2017-08-16 RX ORDER — LORAZEPAM 2 MG/ML
0.25 INJECTION INTRAMUSCULAR EVERY 4 HOURS PRN
Status: DISCONTINUED | OUTPATIENT
Start: 2017-08-16 | End: 2017-08-18 | Stop reason: HOSPADM

## 2017-08-16 RX ORDER — ONDANSETRON 4 MG/1
4 TABLET, FILM COATED ORAL EVERY 6 HOURS PRN
Status: DISCONTINUED | OUTPATIENT
Start: 2017-08-16 | End: 2017-08-18 | Stop reason: HOSPADM

## 2017-08-16 RX ORDER — BUDESONIDE AND FORMOTEROL FUMARATE DIHYDRATE 80; 4.5 UG/1; UG/1
2 AEROSOL RESPIRATORY (INHALATION)
Status: DISCONTINUED | OUTPATIENT
Start: 2017-08-16 | End: 2017-08-18 | Stop reason: HOSPADM

## 2017-08-16 RX ORDER — HALOPERIDOL 5 MG/ML
1 INJECTION INTRAMUSCULAR ONCE
Status: COMPLETED | OUTPATIENT
Start: 2017-08-17 | End: 2017-08-17

## 2017-08-16 RX ORDER — PANTOPRAZOLE SODIUM 40 MG/10ML
40 INJECTION, POWDER, LYOPHILIZED, FOR SOLUTION INTRAVENOUS
Status: DISCONTINUED | OUTPATIENT
Start: 2017-08-16 | End: 2017-08-18 | Stop reason: HOSPADM

## 2017-08-16 RX ORDER — ACETAMINOPHEN 500 MG
1000 TABLET ORAL EVERY 4 HOURS PRN
Status: DISCONTINUED | OUTPATIENT
Start: 2017-08-16 | End: 2017-08-18 | Stop reason: HOSPADM

## 2017-08-16 RX ORDER — QUETIAPINE FUMARATE 25 MG/1
50 TABLET, FILM COATED ORAL NIGHTLY
Status: DISCONTINUED | OUTPATIENT
Start: 2017-08-16 | End: 2017-08-18 | Stop reason: HOSPADM

## 2017-08-16 RX ORDER — CLONAZEPAM 0.5 MG/1
0.5 TABLET ORAL 3 TIMES DAILY
Status: DISCONTINUED | OUTPATIENT
Start: 2017-08-16 | End: 2017-08-18 | Stop reason: HOSPADM

## 2017-08-16 RX ORDER — HALOPERIDOL 5 MG/ML
1 INJECTION INTRAMUSCULAR ONCE
Status: COMPLETED | OUTPATIENT
Start: 2017-08-16 | End: 2017-08-16

## 2017-08-16 RX ORDER — BISACODYL 10 MG
10 SUPPOSITORY, RECTAL RECTAL DAILY PRN
Status: DISCONTINUED | OUTPATIENT
Start: 2017-08-16 | End: 2017-08-18 | Stop reason: HOSPADM

## 2017-08-16 RX ORDER — POLYVINYL ALCOHOL 14 MG/ML
1 SOLUTION/ DROPS OPHTHALMIC
Status: DISCONTINUED | OUTPATIENT
Start: 2017-08-16 | End: 2017-08-18 | Stop reason: HOSPADM

## 2017-08-16 RX ORDER — LORAZEPAM 2 MG/ML
0.5 INJECTION INTRAMUSCULAR ONCE
Status: COMPLETED | OUTPATIENT
Start: 2017-08-16 | End: 2017-08-16

## 2017-08-16 RX ORDER — ALLOPURINOL 100 MG/1
100 TABLET ORAL 2 TIMES DAILY
Status: DISCONTINUED | OUTPATIENT
Start: 2017-08-16 | End: 2017-08-18 | Stop reason: HOSPADM

## 2017-08-16 RX ORDER — DOCUSATE SODIUM 100 MG/1
100 CAPSULE, LIQUID FILLED ORAL 2 TIMES DAILY
Status: DISCONTINUED | OUTPATIENT
Start: 2017-08-16 | End: 2017-08-18 | Stop reason: HOSPADM

## 2017-08-16 RX ORDER — DULOXETIN HYDROCHLORIDE 30 MG/1
30 CAPSULE, DELAYED RELEASE ORAL DAILY
Status: DISCONTINUED | OUTPATIENT
Start: 2017-08-16 | End: 2017-08-18 | Stop reason: HOSPADM

## 2017-08-16 RX ORDER — PRAMIPEXOLE DIHYDROCHLORIDE 0.25 MG/1
1 TABLET ORAL NIGHTLY
Status: DISCONTINUED | OUTPATIENT
Start: 2017-08-16 | End: 2017-08-18 | Stop reason: HOSPADM

## 2017-08-16 RX ORDER — POLYETHYLENE GLYCOL 3350 17 G/17G
17 POWDER, FOR SOLUTION ORAL DAILY
Status: DISCONTINUED | OUTPATIENT
Start: 2017-08-16 | End: 2017-08-18 | Stop reason: HOSPADM

## 2017-08-16 RX ADMIN — HEPARIN SODIUM 5000 UNITS: 5000 INJECTION, SOLUTION INTRAVENOUS; SUBCUTANEOUS at 13:37

## 2017-08-16 RX ADMIN — HALOPERIDOL LACTATE 1 MG: 5 INJECTION, SOLUTION INTRAMUSCULAR at 11:24

## 2017-08-16 RX ADMIN — CARBIDOPA AND LEVODOPA 2 TABLET: 25; 100 TABLET ORAL at 17:57

## 2017-08-16 RX ADMIN — DEXTROSE AND SODIUM CHLORIDE 100 ML/HR: 5; 900 INJECTION, SOLUTION INTRAVENOUS at 13:44

## 2017-08-16 RX ADMIN — QUETIAPINE FUMARATE 25 MG: 25 TABLET, FILM COATED ORAL at 20:27

## 2017-08-16 RX ADMIN — TAZOBACTAM SODIUM AND PIPERACILLIN SODIUM 3.38 G: 375; 3 INJECTION, SOLUTION INTRAVENOUS at 01:25

## 2017-08-16 RX ADMIN — DULOXETINE HYDROCHLORIDE 30 MG: 30 CAPSULE, DELAYED RELEASE ORAL at 17:57

## 2017-08-16 RX ADMIN — BUDESONIDE AND FORMOTEROL FUMARATE DIHYDRATE 2 PUFF: 80; 4.5 AEROSOL RESPIRATORY (INHALATION) at 19:55

## 2017-08-16 RX ADMIN — LORAZEPAM 0.25 MG: 2 INJECTION INTRAMUSCULAR; INTRAVENOUS at 21:37

## 2017-08-16 RX ADMIN — HEPARIN SODIUM 5000 UNITS: 5000 INJECTION, SOLUTION INTRAVENOUS; SUBCUTANEOUS at 05:30

## 2017-08-16 RX ADMIN — DEXTROSE AND SODIUM CHLORIDE 100 ML/HR: 5; 900 INJECTION, SOLUTION INTRAVENOUS at 01:28

## 2017-08-16 RX ADMIN — ALLOPURINOL 100 MG: 100 TABLET ORAL at 17:57

## 2017-08-16 RX ADMIN — PANTOPRAZOLE SODIUM 40 MG: 40 INJECTION, POWDER, FOR SOLUTION INTRAVENOUS at 17:57

## 2017-08-16 RX ADMIN — LORAZEPAM 0.5 MG: 2 INJECTION INTRAMUSCULAR; INTRAVENOUS at 15:25

## 2017-08-16 RX ADMIN — TAZOBACTAM SODIUM AND PIPERACILLIN SODIUM 3.38 G: 375; 3 INJECTION, SOLUTION INTRAVENOUS at 08:20

## 2017-08-16 RX ADMIN — CLONAZEPAM 0.5 MG: 0.5 TABLET ORAL at 20:26

## 2017-08-16 NOTE — SIGNIFICANT NOTE
"   08/16/17 0919   SLP Deferred Reason   SLP Deferred Reason (Attempted swallow eval. Pt confused. No family present.Screamed when SLP tried to reposition her in bed for eval. Refused oral care & PO trials, screaming \"get out of here\" and \"call the police.\" SLP will attempt to f/u when pt more cooperative for eval.)     "

## 2017-08-16 NOTE — PROGRESS NOTES
"    [facility] Medicine Services  INPATIENT PROGRESS NOTE    Date of Admission: 8/15/2017  Length of Stay: 1  Primary Care Physician: No Known Provider    Subjective   CC: brought to ER after being found slumped over at NH    HPI:  Today alert but frightened  Hasn't been getting her usual meds since admission, incl clonipin tid and qhs seroquel.   dtr says breathing is better (had been 'gurgling' at NH prior to her episode of unresponsiveness. )  Per RN, she has been moving all extrems with no sign of focal weakness.    Review Of Systems:   Review of Systems  dtr says that her baseline is \"sweet Shinto lady\", very calm on her usual meds.     Objective      Temp:  [97.2 °F (36.2 °C)-98.6 °F (37 °C)] 98.6 °F (37 °C)  Heart Rate:  [] 103  Resp:  [18] 18  BP: (121-147)/() 147/53  Physical Exam   (limited exam due to patient currently frightened that hosp staff is trying to hurt her)  Gen:  WD/WN elderly woman, alert, anxious, repeatedly asking her dtr for reassurance, dtr at bedside reassuring her that we mean her no harm.   Neuro: alert and oriented, clear speech, grossly nonfocal.  HEENT:  NC/AT  Neck:  Supple,   Heart RRR   Abd:  Not examined  Extrem:  No c/c/e  Psych severe anxiety      Results Review:    I have reviewed the labs, radiology results and diagnostic studies.      Results from last 7 days  Lab Units 08/16/17  0657   WBC 10*3/mm3 6.50   HEMOGLOBIN g/dL 7.9*   PLATELETS 10*3/mm3 348       Results from last 7 days  Lab Units 08/16/17  0657   SODIUM mmol/L 139   POTASSIUM mmol/L 3.9   CHLORIDE mmol/L 107   CO2 mmol/L 24.0   BUN mg/dL 11   CREATININE mg/dL 1.00   GLUCOSE mg/dL 92   CALCIUM mg/dL 8.5*       Culture Data: Cultures:    Blood Culture   Date Value Ref Range Status   08/15/2017 No growth at 24 hours  Preliminary   08/15/2017 No growth at 24 hours  Preliminary       Radiology Data:     I have reviewed the medications.    Assessment/Plan     Problem " List  Hospital Problem List     Aspiration pneumonia of right lower lobe           89 yr old woman, bedbound but gets up with lift, brought from NH after being found slumped over and minimally responsive.     Assessment/Plan:  Plan:     Encephalopathy  - likely from asp episode, now resolved  - chronic small vessel white matter on previous MRI  -     Possible Low Density Abnormality in Posterior Fossa  - tried to accomplish MRI in ER with no success   -- will discuss further with dtr when patient calmer    Dementia/parkinson's  - unclear how severe this is  Speaks fluently today    Aspiration Pneumonia  - abx day 2    Generalized Muscle Weakness also likely effecting muscles of respiration  -  LE Swelling  - immobility / swelling in legs  - LE Duplex - rule out DVT POA    Dehydration  - IV fluids    Anemia  -- progressive since April.   -- serial hgb q12  -- on no NSIADs; does take omepr 20 daily - increase this  -- may need transfusion  -- check stool guaiac     Daughter Confirmed DNR/DNI status in ER.  She says she would not want a feeding tube.    For right now, plan is IV benzo one dose for anxiety, then restart all home meds.  Further discussion with dtr when patient less distressed.  Discussed with rn.     DVT prophylaxis:  Discharge Planning: I expect patient to be discharged to nh in ?? days.    Elsy Belle MD   08/16/17   2:46 PM

## 2017-08-16 NOTE — SIGNIFICANT NOTE
"   08/16/17 1530   SLP Deferred Reason   SLP Deferred Reason Patient/family declined evaluation  (4th attempt to asses the patient. Pt awake but agitated. Dtr/POA (Ms. Murdock) present, denied any h/o dysphagia. Pt refused evaluation, dtr also refused stating that she \"accepts all consequences.\" SLP paged MD to update, will sign off. )     "

## 2017-08-16 NOTE — PROGRESS NOTES
"Adult Nutrition  Assessment/PES    Patient Name:  Meche Duarte  YOB: 1927  MRN: [id number]  Admit Date:  8/15/2017    Assessment Date:  8/16/2017        Reason for Assessment       08/16/17 1253    Reason for Assessment    Reason For Assessment/Visit identified at risk by screening criteria    Identified At Risk By Screening Criteria unintentional loss of 10 lbs or more in the past 2 mos;MST SCORE 2+    Time Spent (min) 20    Cardiac CHF;HTN    Fluid Status Dehydration;Edema   LE swelling    Gastrointestinal GERD/Reflux    Neurological Dementia;Encephalopathy;Parkinson's    Pulmonary/Critical Care Pneumonia;Asthma   aspiration PNA    Other diagnosis generalized muscle weakness; h/o frequent falls, [place]              Nutrition/Diet History       08/16/17 1256    Nutrition/Diet History    Reported/Observed By RN    Other No family present at time of visit. RN reported pt's daughter left around 8am and that patient is currently confused. RD will wait to ask daughter intake/wt hx questions due to patient currently a poor historian.            Anthropometrics       08/16/17 1258    Anthropometrics    Height 165.1 cm (65\")    Weight 65.8 kg (145 lb)    Ideal Body Weight (IBW)    Ideal Body Weight (IBW), Female 57.69    % Ideal Body Weight 114.25    Body Mass Index (BMI)    BMI (kg/m2) 24.18            Labs/Tests/Procedures/Meds       08/16/17 1258    Labs/Tests/Procedures/Meds    Labs/Tests Review Reviewed    Medication Review Reviewed, pertinent                Nutrition Prescription Ordered       08/16/17 1258    Nutrition Prescription PO    Current PO Diet NPO            Evaluation of Received Nutrient/Fluid Intake       08/16/17 1258    PO Evaluation    Number of Days PO Intake Evaluated Insufficient Data              Problem/Interventions:        Problem 1       08/16/17 1258    Nutrition Diagnoses Problem 1    Problem 1 Inadequate Intake/Infusion    Inadequate Intake Type Oral    Etiology (related to) -- "   current diet order    Signs/Symptoms (evidenced by) NPO                    Intervention Goal       08/16/17 1259    Intervention Goal    General Nutrition support treatment    PO Initiate feeding;Establish PO   if/when medically appropriate            Nutrition Intervention       08/16/17 1259    Nutrition Intervention    RD/Tech Action Care plan reviewd;Follow Tx progress;Await begin PO              Education/Evaluation       08/16/17 1254    Monitor/Evaluation    Monitor Per protocol            Electronically signed by:  Nitza Juarez RD  08/16/17 1:00 PM

## 2017-08-16 NOTE — PLAN OF CARE
Problem: Patient Care Overview (Adult)  Goal: Plan of Care Review  Outcome: Ongoing (interventions implemented as appropriate)    08/16/17 1043   Coping/Psychosocial Response Interventions   Plan Of Care Reviewed With patient   Patient Care Overview   Progress no change   Outcome Evaluation   Outcome Summary/Follow up Plan Patient presents with left heel sDTI POA. Area is maroon and non-blanching. Measures 1.0x1.0x0cm. See LDA's for details. Will hold off on PT wound care consult for MIST at this time. Hopeful area will resolve on own in few days. Wound appears to be very superficial. Please contact [facility] in needs arise. Thanks

## 2017-08-16 NOTE — PLAN OF CARE
Problem: Fall Risk (Adult)  Goal: Absence of Falls  Outcome: Ongoing (interventions implemented as appropriate)    Problem: Pneumonia (Adult)  Goal: Signs and Symptoms of Listed Potential Problems Will be Absent or Manageable (Pneumonia)  Outcome: Ongoing (interventions implemented as appropriate)    Problem: Pressure Ulcer Risk (Seth Scale) (Adult,Obstetrics,Pediatric)  Goal: Identify Related Risk Factors and Signs and Symptoms  Outcome: Ongoing (interventions implemented as appropriate)  Goal: Skin Integrity  Outcome: Ongoing (interventions implemented as appropriate)    Problem: Patient Care Overview (Adult)  Goal: Plan of Care Review  Outcome: Ongoing (interventions implemented as appropriate)    08/16/17 0419   Coping/Psychosocial Response Interventions   Plan Of Care Reviewed With patient   Patient Care Overview   Progress no change   Outcome Evaluation   Outcome Summary/Follow up Plan Patient rested well through the night. Purewick catheter in place. VSS. Frequent, loose cough.

## 2017-08-16 NOTE — SIGNIFICANT NOTE
08/16/17 1334   SLP Deferred Reason   SLP Deferred Reason Patient unavailable for evaluation  (Attempted eval again this afternoon. Spoke to RN - pt now unarousable after receiving medication. RN will call if pt becomes appropriate for eval this afternoon. Otherwise, SLP will check back tmrw.)

## 2017-08-17 LAB
ANION GAP SERPL CALCULATED.3IONS-SCNC: 7 MMOL/L (ref 3–11)
BUN BLD-MCNC: 8 MG/DL (ref 9–23)
BUN/CREAT SERPL: 7.3 (ref 7–25)
CALCIUM SPEC-SCNC: 8.2 MG/DL (ref 8.7–10.4)
CHLORIDE SERPL-SCNC: 111 MMOL/L (ref 99–109)
CO2 SERPL-SCNC: 25 MMOL/L (ref 20–31)
CREAT BLD-MCNC: 1.1 MG/DL (ref 0.6–1.3)
DEPRECATED RDW RBC AUTO: 46 FL (ref 37–54)
ERYTHROCYTE [DISTWIDTH] IN BLOOD BY AUTOMATED COUNT: 13.7 % (ref 11.3–14.5)
GFR SERPL CREATININE-BSD FRML MDRD: 47 ML/MIN/1.73
GLUCOSE BLD-MCNC: 124 MG/DL (ref 70–100)
HCT VFR BLD AUTO: 28 % (ref 34.5–44)
HGB BLD-MCNC: 8.8 G/DL (ref 11.5–15.5)
IRON 24H UR-MRATE: 23 MCG/DL (ref 50–175)
IRON SATN MFR SERPL: 12 % (ref 15–50)
MCH RBC QN AUTO: 28.9 PG (ref 27–31)
MCHC RBC AUTO-ENTMCNC: 31.4 G/DL (ref 32–36)
MCV RBC AUTO: 92.1 FL (ref 80–99)
PLATELET # BLD AUTO: 409 10*3/MM3 (ref 150–450)
PMV BLD AUTO: 9.3 FL (ref 6–12)
POTASSIUM BLD-SCNC: 3.6 MMOL/L (ref 3.5–5.5)
RBC # BLD AUTO: 3.04 10*6/MM3 (ref 3.89–5.14)
SODIUM BLD-SCNC: 143 MMOL/L (ref 132–146)
TIBC SERPL-MCNC: 188 MCG/DL (ref 250–450)
WBC NRBC COR # BLD: 7.13 10*3/MM3 (ref 3.5–10.8)

## 2017-08-17 PROCEDURE — 25010000002 HALOPERIDOL LACTATE PER 5 MG: Performed by: INTERNAL MEDICINE

## 2017-08-17 PROCEDURE — 94640 AIRWAY INHALATION TREATMENT: CPT

## 2017-08-17 PROCEDURE — 80048 BASIC METABOLIC PNL TOTAL CA: CPT | Performed by: INTERNAL MEDICINE

## 2017-08-17 PROCEDURE — 83540 ASSAY OF IRON: CPT | Performed by: INTERNAL MEDICINE

## 2017-08-17 PROCEDURE — 25010000002 HALOPERIDOL LACTATE PER 5 MG: Performed by: NURSE PRACTITIONER

## 2017-08-17 PROCEDURE — 85027 COMPLETE CBC AUTOMATED: CPT | Performed by: INTERNAL MEDICINE

## 2017-08-17 PROCEDURE — 25010000002 PIPERACILLIN SOD-TAZOBACTAM PER 1 G: Performed by: HOSPITALIST

## 2017-08-17 PROCEDURE — 94799 UNLISTED PULMONARY SVC/PX: CPT

## 2017-08-17 PROCEDURE — 25010000002 HEPARIN (PORCINE) PER 1000 UNITS: Performed by: HOSPITALIST

## 2017-08-17 PROCEDURE — 99232 SBSQ HOSP IP/OBS MODERATE 35: CPT | Performed by: INTERNAL MEDICINE

## 2017-08-17 PROCEDURE — 83550 IRON BINDING TEST: CPT | Performed by: INTERNAL MEDICINE

## 2017-08-17 RX ORDER — HALOPERIDOL 5 MG/ML
0.5 INJECTION INTRAMUSCULAR ONCE
Status: COMPLETED | OUTPATIENT
Start: 2017-08-17 | End: 2017-08-17

## 2017-08-17 RX ADMIN — CLONAZEPAM 0.5 MG: 0.5 TABLET ORAL at 15:50

## 2017-08-17 RX ADMIN — DULOXETINE HYDROCHLORIDE 30 MG: 30 CAPSULE, DELAYED RELEASE ORAL at 09:29

## 2017-08-17 RX ADMIN — POLYETHYLENE GLYCOL 3350 17 G: 17 POWDER, FOR SOLUTION ORAL at 09:29

## 2017-08-17 RX ADMIN — BUDESONIDE AND FORMOTEROL FUMARATE DIHYDRATE 2 PUFF: 80; 4.5 AEROSOL RESPIRATORY (INHALATION) at 09:09

## 2017-08-17 RX ADMIN — ALLOPURINOL 100 MG: 100 TABLET ORAL at 09:29

## 2017-08-17 RX ADMIN — TAZOBACTAM SODIUM AND PIPERACILLIN SODIUM 3.38 G: 375; 3 INJECTION, SOLUTION INTRAVENOUS at 06:04

## 2017-08-17 RX ADMIN — SODIUM CHLORIDE 250 ML: 9 INJECTION, SOLUTION INTRAVENOUS at 11:13

## 2017-08-17 RX ADMIN — CARBIDOPA AND LEVODOPA 2 TABLET: 25; 100 TABLET ORAL at 09:29

## 2017-08-17 RX ADMIN — HEPARIN SODIUM 5000 UNITS: 5000 INJECTION, SOLUTION INTRAVENOUS; SUBCUTANEOUS at 06:02

## 2017-08-17 RX ADMIN — HEPARIN SODIUM 5000 UNITS: 5000 INJECTION, SOLUTION INTRAVENOUS; SUBCUTANEOUS at 21:03

## 2017-08-17 RX ADMIN — CLONAZEPAM 0.5 MG: 0.5 TABLET ORAL at 09:29

## 2017-08-17 RX ADMIN — TAZOBACTAM SODIUM AND PIPERACILLIN SODIUM 3.38 G: 375; 3 INJECTION, SOLUTION INTRAVENOUS at 23:36

## 2017-08-17 RX ADMIN — ALLOPURINOL 100 MG: 100 TABLET ORAL at 17:13

## 2017-08-17 RX ADMIN — PANTOPRAZOLE SODIUM 40 MG: 40 INJECTION, POWDER, FOR SOLUTION INTRAVENOUS at 09:29

## 2017-08-17 RX ADMIN — HEPARIN SODIUM 5000 UNITS: 5000 INJECTION, SOLUTION INTRAVENOUS; SUBCUTANEOUS at 14:22

## 2017-08-17 RX ADMIN — PRAMIPEXOLE DIHYDROCHLORIDE 1 MG: 0.25 TABLET ORAL at 21:03

## 2017-08-17 RX ADMIN — DOCUSATE SODIUM 100 MG: 100 CAPSULE, LIQUID FILLED ORAL at 17:13

## 2017-08-17 RX ADMIN — PANTOPRAZOLE SODIUM 40 MG: 40 INJECTION, POWDER, FOR SOLUTION INTRAVENOUS at 17:14

## 2017-08-17 RX ADMIN — TAZOBACTAM SODIUM AND PIPERACILLIN SODIUM 3.38 G: 375; 3 INJECTION, SOLUTION INTRAVENOUS at 15:50

## 2017-08-17 RX ADMIN — HALOPERIDOL LACTATE 1 MG: 5 INJECTION, SOLUTION INTRAMUSCULAR at 00:05

## 2017-08-17 RX ADMIN — DOCUSATE SODIUM 100 MG: 100 CAPSULE, LIQUID FILLED ORAL at 09:30

## 2017-08-17 RX ADMIN — CARBIDOPA AND LEVODOPA 2 TABLET: 25; 100 TABLET ORAL at 17:13

## 2017-08-17 RX ADMIN — QUETIAPINE FUMARATE 50 MG: 25 TABLET, FILM COATED ORAL at 21:03

## 2017-08-17 RX ADMIN — HALOPERIDOL LACTATE 0.5 MG: 5 INJECTION, SOLUTION INTRAMUSCULAR at 11:18

## 2017-08-17 RX ADMIN — CLONAZEPAM 0.5 MG: 0.5 TABLET ORAL at 21:04

## 2017-08-17 RX ADMIN — BUDESONIDE AND FORMOTEROL FUMARATE DIHYDRATE 2 PUFF: 80; 4.5 AEROSOL RESPIRATORY (INHALATION) at 19:57

## 2017-08-17 NOTE — PROGRESS NOTES
"    [facility] Medicine Services  INPATIENT PROGRESS NOTE    Date of Admission: 8/15/2017  Length of Stay: 2  Primary Care Physician: No Known Provider    Subjective   CC: brought to ER after being found slumped over at NH    HPI:  continuues to seem frightened - very hard to understand due to whispered speech (yesterday she vocalized normally to dtr).   Asks for water.   Per RN she has remained anxious and was combative in the night; got low dose haldol.   Fluids were stopped due to patient pulling on line.     Review Of Systems:   Review of Systems  dtr says that her baseline is \"sweet Taoism lady\", very calm on her usual meds.     Objective      Temp:  [98.3 °F (36.8 °C)-98.7 °F (37.1 °C)] 98.7 °F (37.1 °C)  Heart Rate:  [] 114  Resp:  [16-18] 18  BP: (139-176)/(53-76) 176/76  Physical Exam   Gen:  WD/WN elderly woman, alert, anxious, no faimly present.    speaks in a whisper, hard to understand   Neuro: alert , seems hypervigilant, , grossly nonfocal.  HEENT:  NC/AT  Neck:  Supple,   Heart RRR   Lungs CTA nonlabored no w r r.   Abd:  Soft benign, no rebound or guarding.   Extrem:  No c/c/e  Skin:  Left heel dressing, left ankle wrapped with kerlix.   Psych still looks anxious, less so than yesterday      Results Review:    I have reviewed the labs, radiology results and diagnostic studies.      Results from last 7 days  Lab Units 08/16/17  0657   WBC 10*3/mm3 6.50   HEMOGLOBIN g/dL 7.9*   PLATELETS 10*3/mm3 348       Results from last 7 days  Lab Units 08/16/17  0657   SODIUM mmol/L 139   POTASSIUM mmol/L 3.9   CHLORIDE mmol/L 107   CO2 mmol/L 24.0   BUN mg/dL 11   CREATININE mg/dL 1.00   GLUCOSE mg/dL 92   CALCIUM mg/dL 8.5*       Culture Data: Cultures:    Blood Culture   Date Value Ref Range Status   08/15/2017 No growth at 24 hours  Preliminary   08/15/2017 No growth at 24 hours  Preliminary       Radiology Data:     I have reviewed the medications.    Assessment/Plan "     Problem List  Hospital Problem List     Aspiration pneumonia of right lower lobe           89 yr old woman, bedbound but gets up with lift, brought from NH after being found slumped over and minimally responsive.     Assessment/Plan:  Plan:     Encephalopathy  - likely from asp episode, improved  - chronic small vessel white matter on previous MRI  -     Possible Low Density Abnormality in Posterior Fossa  - tried to accomplish MRI in ER with no success   -- will discuss further with dtr when she is here.  I would not pursue this aggressively    Dementia/parkinson's  - unclear how severe this is  -- anxiety limits exam    Aspiration Pneumonia  - abx day 3  -- patient and family have been declining speech eval due to patient's current severe anxiety and paranoid ideation.    -- will reconsult speech later if patient is calmer.  For now, regular diet for comfort.     Generalized Muscle Weakness also likely effecting muscles of respiration  -  LE Swelling, minimal  - immobility / swelling in legs  - patient/family  refused doppler due to fearfulness    Dehydration  - IV fluids    Anemia  -- progressive since April.   -- on no NSIADs; does take omepr 20 daily - increase this  -- may need transfusion  -- check stool guaiac  -- didn't get labs this morning, prob due to anxiety.     Daughter Confirmed DNR/DNI status in ER.  She says she would not want a feeding tube.    Her anxiety is hampering my ability to care for her.  Try haldol IV .5 x1.  I will speak further to her dtr when she returns.      DVT prophylaxis:  Discharge Planning: I expect patient to be discharged to nh in ?? days.    Elsy Belle MD   08/17/17   10:03 AM

## 2017-08-17 NOTE — PROGRESS NOTES
Continued Stay Note  [facility]     Patient Name: Meche Duarte  MRN: [id number]  Today's Date: 8/17/2017    Admit Date: 8/15/2017          Discharge Plan       08/17/17 0834    Case Management/Social Work Plan    Plan [facility]    Patient/Family In Agreement With Plan yes    Additional Comments per chayo at [facility]. Mrs Duarte is in a long term intermediate level bed at [facility]. her bed is on hold x 14 days. Case mgt will follow and assist with transfer back when medically ready.              Discharge Codes     None        Expected Discharge Date and Time     Expected Discharge Date Expected Discharge Time    Aug 19, 2017             Sonja C Kellerman, RN

## 2017-08-17 NOTE — PLAN OF CARE
Problem: Fall Risk (Adult)  Goal: Absence of Falls  Outcome: Ongoing (interventions implemented as appropriate)    Problem: Pneumonia (Adult)  Goal: Signs and Symptoms of Listed Potential Problems Will be Absent or Manageable (Pneumonia)  Outcome: Ongoing (interventions implemented as appropriate)    Problem: Pressure Ulcer Risk (Seth Scale) (Adult,Obstetrics,Pediatric)  Goal: Identify Related Risk Factors and Signs and Symptoms  Outcome: Ongoing (interventions implemented as appropriate)  Goal: Skin Integrity  Outcome: Ongoing (interventions implemented as appropriate)    Problem: Patient Care Overview (Adult)  Goal: Plan of Care Review    08/17/17 0422   Coping/Psychosocial Response Interventions   Plan Of Care Reviewed With patient   Patient Care Overview   Progress no change   Outcome Evaluation   Outcome Summary/Follow up Plan Patient remained very agitated, combative most of the night. PRN ativan given once, did not help patient. PRN haldol given after and pt was resting with eyes closed, but legs and arms are restless. Pt refused VS and night time medications.

## 2017-08-18 VITALS
RESPIRATION RATE: 16 BRPM | SYSTOLIC BLOOD PRESSURE: 166 MMHG | DIASTOLIC BLOOD PRESSURE: 89 MMHG | OXYGEN SATURATION: 92 % | HEART RATE: 88 BPM | TEMPERATURE: 97.6 F | WEIGHT: 145 LBS | HEIGHT: 65 IN | BODY MASS INDEX: 24.16 KG/M2

## 2017-08-18 LAB
ANION GAP SERPL CALCULATED.3IONS-SCNC: 5 MMOL/L (ref 3–11)
BUN BLD-MCNC: 7 MG/DL (ref 9–23)
BUN/CREAT SERPL: 6.4 (ref 7–25)
CALCIUM SPEC-SCNC: 8.6 MG/DL (ref 8.7–10.4)
CHLORIDE SERPL-SCNC: 113 MMOL/L (ref 99–109)
CO2 SERPL-SCNC: 24 MMOL/L (ref 20–31)
CREAT BLD-MCNC: 1.1 MG/DL (ref 0.6–1.3)
DEPRECATED RDW RBC AUTO: 46.1 FL (ref 37–54)
ERYTHROCYTE [DISTWIDTH] IN BLOOD BY AUTOMATED COUNT: 13.7 % (ref 11.3–14.5)
GFR SERPL CREATININE-BSD FRML MDRD: 47 ML/MIN/1.73
GLUCOSE BLD-MCNC: 80 MG/DL (ref 70–100)
HCT VFR BLD AUTO: 26.9 % (ref 34.5–44)
HGB BLD-MCNC: 8.6 G/DL (ref 11.5–15.5)
MCH RBC QN AUTO: 29.4 PG (ref 27–31)
MCHC RBC AUTO-ENTMCNC: 32 G/DL (ref 32–36)
MCV RBC AUTO: 91.8 FL (ref 80–99)
PLATELET # BLD AUTO: 406 10*3/MM3 (ref 150–450)
PMV BLD AUTO: 9.5 FL (ref 6–12)
POTASSIUM BLD-SCNC: 3.4 MMOL/L (ref 3.5–5.5)
RBC # BLD AUTO: 2.93 10*6/MM3 (ref 3.89–5.14)
SODIUM BLD-SCNC: 142 MMOL/L (ref 132–146)
WBC NRBC COR # BLD: 7.44 10*3/MM3 (ref 3.5–10.8)

## 2017-08-18 PROCEDURE — 99239 HOSP IP/OBS DSCHRG MGMT >30: CPT | Performed by: INTERNAL MEDICINE

## 2017-08-18 PROCEDURE — 85027 COMPLETE CBC AUTOMATED: CPT | Performed by: INTERNAL MEDICINE

## 2017-08-18 PROCEDURE — 25010000002 HEPARIN (PORCINE) PER 1000 UNITS: Performed by: HOSPITALIST

## 2017-08-18 PROCEDURE — 80048 BASIC METABOLIC PNL TOTAL CA: CPT | Performed by: INTERNAL MEDICINE

## 2017-08-18 PROCEDURE — 25010000002 PIPERACILLIN SOD-TAZOBACTAM PER 1 G: Performed by: HOSPITALIST

## 2017-08-18 PROCEDURE — 94640 AIRWAY INHALATION TREATMENT: CPT

## 2017-08-18 PROCEDURE — 25010000002 LORAZEPAM PER 2 MG: Performed by: NURSE PRACTITIONER

## 2017-08-18 PROCEDURE — 94799 UNLISTED PULMONARY SVC/PX: CPT

## 2017-08-18 RX ORDER — COLCHICINE 0.6 MG/1
0.3 TABLET ORAL DAILY
Qty: 3 TABLET | Refills: 0 | Status: SHIPPED | OUTPATIENT
Start: 2017-08-18 | End: 2017-08-23

## 2017-08-18 RX ORDER — AMOXICILLIN AND CLAVULANATE POTASSIUM 500; 125 MG/1; MG/1
1 TABLET, FILM COATED ORAL EVERY 12 HOURS SCHEDULED
Qty: 8 TABLET | Refills: 0 | Status: SHIPPED | OUTPATIENT
Start: 2017-08-18

## 2017-08-18 RX ORDER — DOXYCYCLINE HYCLATE 100 MG/1
100 CAPSULE ORAL EVERY 12 HOURS SCHEDULED
Status: DISCONTINUED | OUTPATIENT
Start: 2017-08-18 | End: 2017-08-18

## 2017-08-18 RX ORDER — CLONAZEPAM 0.5 MG/1
0.5 TABLET ORAL 3 TIMES DAILY
Qty: 10 TABLET | Refills: 0 | Status: SHIPPED | OUTPATIENT
Start: 2017-08-18

## 2017-08-18 RX ORDER — AMOXICILLIN AND CLAVULANATE POTASSIUM 500; 125 MG/1; MG/1
1 TABLET, FILM COATED ORAL EVERY 12 HOURS SCHEDULED
Status: DISCONTINUED | OUTPATIENT
Start: 2017-08-18 | End: 2017-08-18 | Stop reason: HOSPADM

## 2017-08-18 RX ORDER — COLCHICINE 0.6 MG/1
0.3 TABLET ORAL ONCE
Status: COMPLETED | OUTPATIENT
Start: 2017-08-18 | End: 2017-08-18

## 2017-08-18 RX ADMIN — PANTOPRAZOLE SODIUM 40 MG: 40 INJECTION, POWDER, FOR SOLUTION INTRAVENOUS at 17:11

## 2017-08-18 RX ADMIN — CLONAZEPAM 0.5 MG: 0.5 TABLET ORAL at 09:13

## 2017-08-18 RX ADMIN — PANTOPRAZOLE SODIUM 40 MG: 40 INJECTION, POWDER, FOR SOLUTION INTRAVENOUS at 09:13

## 2017-08-18 RX ADMIN — DULOXETINE HYDROCHLORIDE 30 MG: 30 CAPSULE, DELAYED RELEASE ORAL at 09:13

## 2017-08-18 RX ADMIN — CARBIDOPA AND LEVODOPA 2 TABLET: 25; 100 TABLET ORAL at 09:13

## 2017-08-18 RX ADMIN — HEPARIN SODIUM 5000 UNITS: 5000 INJECTION, SOLUTION INTRAVENOUS; SUBCUTANEOUS at 05:17

## 2017-08-18 RX ADMIN — DOCUSATE SODIUM 100 MG: 100 CAPSULE, LIQUID FILLED ORAL at 17:11

## 2017-08-18 RX ADMIN — TAZOBACTAM SODIUM AND PIPERACILLIN SODIUM 3.38 G: 375; 3 INJECTION, SOLUTION INTRAVENOUS at 09:13

## 2017-08-18 RX ADMIN — HEPARIN SODIUM 5000 UNITS: 5000 INJECTION, SOLUTION INTRAVENOUS; SUBCUTANEOUS at 14:29

## 2017-08-18 RX ADMIN — ALLOPURINOL 100 MG: 100 TABLET ORAL at 09:13

## 2017-08-18 RX ADMIN — ALLOPURINOL 100 MG: 100 TABLET ORAL at 17:12

## 2017-08-18 RX ADMIN — CLONAZEPAM 0.5 MG: 0.5 TABLET ORAL at 17:11

## 2017-08-18 RX ADMIN — POLYETHYLENE GLYCOL 3350 17 G: 17 POWDER, FOR SOLUTION ORAL at 09:13

## 2017-08-18 RX ADMIN — CARBIDOPA AND LEVODOPA 2 TABLET: 25; 100 TABLET ORAL at 17:11

## 2017-08-18 RX ADMIN — AMOXICILLIN AND CLAVULANATE POTASSIUM 500 MG: 500; 125 TABLET, FILM COATED ORAL at 10:49

## 2017-08-18 RX ADMIN — LORAZEPAM 0.25 MG: 2 INJECTION INTRAMUSCULAR; INTRAVENOUS at 09:14

## 2017-08-18 RX ADMIN — COLCHICINE 0.3 MG: 0.6 TABLET, FILM COATED ORAL at 10:49

## 2017-08-18 RX ADMIN — BUDESONIDE AND FORMOTEROL FUMARATE DIHYDRATE 2 PUFF: 80; 4.5 AEROSOL RESPIRATORY (INHALATION) at 09:01

## 2017-08-18 RX ADMIN — DOCUSATE SODIUM 100 MG: 100 CAPSULE, LIQUID FILLED ORAL at 09:13

## 2017-08-18 NOTE — PROGRESS NOTES
Adult Nutrition  Assessment/PES    Patient Name:  Meche Duarte  YOB: 1927  MRN: [id number]  Admit Date:  8/15/2017    Assessment Date:  8/18/2017        Reason for Assessment       08/18/17 1158    Reason for Assessment    Reason For Assessment/Visit follow up protocol    Time Spent (min) 15                        Nutrition Prescription Ordered       08/18/17 1158    Nutrition Prescription PO    Current PO Diet Regular            Evaluation of Received Nutrient/Fluid Intake       08/18/17 1159    PO Evaluation    Number of Meals 3    % PO Intake 67              Problem/Interventions:        Problem 1       08/18/17 1159    Nutrition Diagnoses Problem 1    Problem 1 Nutrition Appropriate for Condition at this Time    Etiology (related to) MNT for Treatment/Condition    Signs/Symptoms (evidenced by) PO Intake    Percent (%) intake recorded 67 %    Over number of meals 3                    Intervention Goal       08/18/17 1159    Intervention Goal    PO Maintain intake            Nutrition Intervention       08/18/17 1159    Nutrition Intervention    RD/Tech Action Follow Tx progress              Education/Evaluation       08/18/17 1159    Monitor/Evaluation    Monitor Per protocol        Comments:      Electronically signed by:  Chantal Toscano MS,RD,LD  08/18/17 11:59 AM

## 2017-08-18 NOTE — PROGRESS NOTES
"    [facility] Medicine Services  INPATIENT PROGRESS NOTE    Date of Admission: 8/15/2017  Length of Stay: 3  Primary Care Physician: No Known Provider    Subjective   CC: brought to ER after being found slumped over at NH    HPI:  Less frightened today  Recnetly given .25 ativan due to trying to climb out of bed  No complaints  Ate half her bkfast    Review Of Systems:   Review of Systems  dtr says that her baseline is \"sweet Druze lady\", very calm on her usual meds.     Objective      Temp:  [97 °F (36.1 °C)-98.2 °F (36.8 °C)] 98.2 °F (36.8 °C)  Heart Rate:  [88-97] 88  Resp:  [16-18] 16  BP: (139-162)/(61-86) 156/68  Physical Exam   Gen:  WD/WN elderly woman, alert,, no faimly present.  Speaks a bit louder than yest, still hard to understand   Neuro: alert , seems hypervigilant, , grossly nonfocal.  HEENT:  NC/AT  Neck:  Supple,   Heart RRR   Lungs CTA nonlabored no w r r.   Abd:  Soft benign, no rebound or guarding.   Extrem:  No c/c/e.  4th finger left hand has distal erythmea/tenderness  Skin:  Left heel dressing, left ankle wrapped with kerlix.   Psych less anxious than yesterday      Results Review:    I have reviewed the labs, radiology results and diagnostic studies.      Results from last 7 days  Lab Units 08/18/17  0517   WBC 10*3/mm3 7.44   HEMOGLOBIN g/dL 8.6*   PLATELETS 10*3/mm3 406       Results from last 7 days  Lab Units 08/18/17  0517   SODIUM mmol/L 142   POTASSIUM mmol/L 3.4*   CHLORIDE mmol/L 113*   CO2 mmol/L 24.0   BUN mg/dL 7*   CREATININE mg/dL 1.10   GLUCOSE mg/dL 80   CALCIUM mg/dL 8.6*       Culture Data: Cultures:    Blood Culture   Date Value Ref Range Status   08/15/2017 No growth at 24 hours  Preliminary   08/15/2017 No growth at 24 hours  Preliminary       Radiology Data:     I have reviewed the medications.    Assessment/Plan     Problem List  Hospital Problem List     Aspiration pneumonia of right lower lobe           89 yr old woman, bedbound but gets " up with lift, brought from NH after being found slumped over and minimally responsive.     Assessment/Plan:  Plan:     Encephalopathy  - likely from asp episode, improved  - chronic small vessel white matter on previous MRI  - may be baseline - seems calmer today    Distal 4th finger inflammation  -- gout vs cellulitis  -- on zosyn but add doxy oral    Possible Low Density Abnormality in Posterior Fossa  - tried to accomplish MRI in ER with no success   -- will discuss further with dtr when she is here.  I would not pursue this aggressively    Dementia/parkinson's  - unclear how severe this is  -- anxiety limits exam    Aspiration Pneumonia  - abx day 4 - will change to augmentin  -- patient and family have been declining speech eval due to patient's current severe anxiety and paranoid ideation.    -- will reconsult speech later if patient is calmer.  For now, regular diet for comfort.     Generalized Muscle Weakness also likely effecting muscles of respiration  -  LE Swelling, minimal  - immobility / swelling in legs  - patient/family  refused doppler due to fearfulness    Dehydration  - IV fluids    Anemia  -- slowly  progressive since April.   -- on no NSIADs; does take omepr 20 daily - increase this  -- may need transfusion  -- check stool guaiac  -     Daughter Confirmed DNR/DNI status in ER.  She says she would not want a feeding tube.    Left msg for daughter.   Patient is close to DC but I would like to speak w dtr first     DVT prophylaxis:  Discharge Planning: I expect patient to be discharged to nh in ?? days.    Elsy Belle MD   08/18/17   9:37 AM

## 2017-08-18 NOTE — DISCHARGE SUMMARY
[facility] Medicine Services  DISCHARGE SUMMARY       Date of Admission: 8/15/2017  Date of Discharge:  8/18/2017  Primary Care Physician: No Known Provider  Consulting Physician(s)          None           Discharge Diagnoses:  Active Hospital Problems (** Indicates Principal Problem)    Diagnosis Date Noted   • Aspiration pneumonia of right lower lobe [J69.0] 08/15/2017      Resolved Hospital Problems    Diagnosis Date Noted Date Resolved   No resolved problems to display.       Presenting Problem/History of Present Illness  Aspiration pneumonia of right lower lobe, unspecified aspiration pneumonia type [J69.0]     Chief Complaint on Day of Discharge:   No complaints.  She has been slightly confused but calm.   Breathing is fine    History of Present Illness on Day of Discharge:    Daughter had noted some rhonchorous breathing the day before admission; she then was found slumped over at NH the next day.  On admission, CXR suggested a mild RLL pneumonia.     Hospital Course  Patient is a 89 y.o. female presented with decreased mental status.  CXR showed mild RRL infiltrate.  She was started on zosyn.  There was initially some concern for CVA but head CT did not establish this.  There was a small abnormality on CT of uncertain significance, but this was not pursued due to patient's anxiety and the fact that MRI had been normal in April 2017, minimizing concern for neoplasm    She will continue augmentin for a 7 day course.     She missed several doses of her usual Klonopin tid and became very anxious.  Daughter was on hand to reassure her and her usual home meds were restarted.  Mild anxiety continued at times but was easily managed with very low doses of haldol or prn ativan.  She has been eating well.  There is no dyspnea.      She is chronically anemic and has low iron.  I am concerned that oral iron will cause constipation and decrease her quality of life.  Will defer this decision to  "PCP and daughter.     She has erythema and tenderness of the left fourth finger distally.  Her daughter Daina tells me this is chronic.  I am trying colchicine .3mg x 5 days.  Apparently antibiotics have failed to resolve it in the past.     Due to anxiety, she was not able to get a swallowing evaluation.        Procedures Performed  I     Consults:   Consults     Date and Time Order Name Status Description    8/15/2017 1012 Consult Interventional Neurologist and/or Stroke Team Completed           Pertinent Test Results:  CT head   IMPRESSION:  Low-density area in the region of the right cerebellar  peduncle, of questionable significance, but more prominent than on any  previous study. Consider brain MRI followup.    .      Results from last 7 days  Lab Units 08/18/17  0517 08/17/17  1031 08/16/17  0657   WBC 10*3/mm3 7.44 7.13 6.50   HEMOGLOBIN g/dL 8.6* 8.8* 7.9*   HEMATOCRIT % 26.9* 28.0* 25.5*   PLATELETS 10*3/mm3 406 409 348       Results from last 7 days  Lab Units 08/18/17  0517 08/17/17  1031 08/16/17  0657   SODIUM mmol/L 142 143 139   POTASSIUM mmol/L 3.4* 3.6 3.9   CHLORIDE mmol/L 113* 111* 107   CO2 mmol/L 24.0 25.0 24.0   BUN mg/dL 7* 8* 11   CREATININE mg/dL 1.10 1.10 1.00   GLUCOSE mg/dL 80 124* 92   CALCIUM mg/dL 8.6* 8.2* 8.5*     Iron  Results for HEATH DEAN (MRN [id number]) as of 8/18/2017 11:46   Ref. Range 8/17/2017 10:31   Iron Latest Ref Range: 50 - 175 mcg/dL 23 (L)   Iron Saturation Latest Ref Range: 15 - 50 % 12 (L)   TIBC Latest Ref Range: 250 - 450 mcg/dL 188 (L)               Condition on Discharge:  Fair.  She is still a little anxious but speaks to me, asks for what she needs, follows simple commands, and    Physical Exam on Discharge:/68  Pulse 88  Temp 98.2 °F (36.8 °C)  Resp 16  Ht 65\" (165.1 cm)  Wt 145 lb (65.8 kg)  SpO2 91%  BMI 24.13 kg/m2  Physical Exam  Gen:  WD/WN elderly woman, alert, conversant but speaks in a whisper and is often hard to understand  Neuro: " alert and mildly confused, not oriented to place, clear speech, follows simple commands, grossly nonfocal  HEENT:  NC/AT PERRL, OP benign  Neck:  Supple, no LAD  Heart RRR no murmur, rub, or gallop  Lungs CTA nonlabored no w r r  Abd:  Soft, nontender, no rebound or guarding, pos BS  Extrem:  No c/c/e.  Dressing on L shin and L heel.   Psych - mild anxiety but compliant and appropriate.       Discharge Disposition      Discharge Medications   Meche Duarte   Home Medication Instructions OSCAR:[id number]    Printed on:08/18/17 6791   Medication Information                      acetaminophen (TYLENOL) 500 MG tablet  Take 1,000 mg by mouth Every 4 (Four) Hours As Needed for Mild Pain  or Fever.             allopurinol (ZYLOPRIM) 100 MG tablet  Take 100 mg by mouth 2 (Two) Times a Day.             bisacodyl (DULCOLAX) 10 MG suppository  Insert 10 mg into the rectum Daily As Needed for Constipation.             carbidopa-levodopa (SINEMET)  MG per tablet  Take 2 tablets by mouth 2 (Two) Times a Day.             carboxymethylcellulose 1 % ophthalmic solution  Apply 1 drop to eye 2 (Two) Times a Day.             clonazePAM (KlonoPIN) 0.5 MG tablet  Take 0.5 mg by mouth 3 (Three) Times a Day.             docusate sodium (COLACE) 100 MG capsule  Take 100 mg by mouth 2 (Two) Times a Day.             DULoxetine (CYMBALTA) 30 MG capsule  Take 30 mg by mouth Daily.             fluticasone-salmeterol (ADVAIR) 500-50 MCG/DOSE DISKUS  Inhale 1 puff 2 (Two) Times a Day.             magnesium hydroxide (MILK OF MAGNESIA) 2400 MG/10ML suspension suspension  Take 30 mL by mouth Daily As Needed.             nitroglycerin (NITROSTAT) 0.4 MG SL tablet  Place 0.4 mg under the tongue Every 5 (Five) Minutes As Needed for Chest Pain. Take no more than 3 doses in 15 minutes.             omeprazole (priLOSEC) 20 MG capsule  Take 20 mg by mouth Daily.             ondansetron (ZOFRAN) 4 MG tablet  Take 4 mg by mouth Every 6 (Six) Hours As  Needed for Nausea or Vomiting.             polyethylene glycol (MIRALAX) packet  Take 17 g by mouth Daily.             pramipexole (MIRAPEX) 1 MG tablet  Take 1 mg by mouth Every Night.             QUEtiapine (SEROquel) 50 MG tablet  Take 50 mg by mouth Every Night.             senna (SENOKOT) 8.6 MG tablet tablet  Take 2 tablets by mouth 2 (Two) Times a Day.                 Discharge Diet:  no change     Discharge Care Plan / Instructions:     Activity at Discharge: as tolerated    Follow-up Appointments  PCP followup       Test Results Pending at Discharge   Order Current Status    Blood Culture Preliminary result    Blood Culture Preliminary result           Elsy Belle MD 08/18/17 11:40 AM    Time: 45 mins    Please note that portions of this note may have been completed with a voice recognition program. Efforts were made to edit the dictations, but occasionally words are mistranscribed.

## 2017-08-18 NOTE — PLAN OF CARE
Problem: Pneumonia (Adult)  Intervention: Monitor/Manage Fluid Electrolyte Balance    08/18/17 0107   Positioning   Positioning semi-Fowlers;weight shift assistance provided   Nutrition Interventions   Fluid/Electrolyte Management fluids provided;intravenous fluid replacement initiated

## 2017-08-18 NOTE — PROGRESS NOTES
Continued Stay Note  [facility]     Patient Name: Meche Durate  MRN: [id number]  Today's Date: 8/18/2017    Admit Date: 8/15/2017          Discharge Plan       08/18/17 1346    Case Management/Social Work Plan    Plan [facility]    Patient/Family In Agreement With Plan yes    Additional Comments Arrangements made for transfre back to a skilled level bed at [facility]. Daughter at bedside  is in agreement with plan. [facility] ambulance scheduled for transport at 6pm. Call report to [phone]              Discharge Codes     None        Expected Discharge Date and Time     Expected Discharge Date Expected Discharge Time    Aug 21, 2017             Sonja C Kellerman, RN

## 2017-08-20 LAB
BACTERIA SPEC AEROBE CULT: NORMAL
BACTERIA SPEC AEROBE CULT: NORMAL

## 2018-12-24 ENCOUNTER — LAB REQUISITION (OUTPATIENT)
Dept: LAB | Facility: HOSPITAL | Age: 83
End: 2018-12-24

## 2018-12-24 DIAGNOSIS — Z00.00 ROUTINE GENERAL MEDICAL EXAMINATION AT A HEALTH CARE FACILITY: ICD-10-CM

## 2018-12-24 LAB
AMORPH URATE CRY URNS QL MICRO: ABNORMAL /HPF
BACTERIA UR QL AUTO: ABNORMAL /HPF
BILIRUB UR QL STRIP: NEGATIVE
CLARITY UR: ABNORMAL
COLOR UR: YELLOW
GLUCOSE UR STRIP-MCNC: NEGATIVE MG/DL
HGB UR QL STRIP.AUTO: NEGATIVE
HYALINE CASTS UR QL AUTO: ABNORMAL /LPF
KETONES UR QL STRIP: NEGATIVE
LEUKOCYTE ESTERASE UR QL STRIP.AUTO: NEGATIVE
NITRITE UR QL STRIP: NEGATIVE
PH UR STRIP.AUTO: <=5 [PH] (ref 5–8)
PROT UR QL STRIP: NEGATIVE
RBC # UR: ABNORMAL /HPF
REF LAB TEST METHOD: ABNORMAL
SP GR UR STRIP: >=1.03 (ref 1–1.03)
SQUAMOUS #/AREA URNS HPF: ABNORMAL /HPF
UROBILINOGEN UR QL STRIP: ABNORMAL
WBC UR QL AUTO: ABNORMAL /HPF
YEAST URNS QL MICRO: ABNORMAL /HPF

## 2018-12-24 PROCEDURE — 81001 URINALYSIS AUTO W/SCOPE: CPT

## 2019-01-01 ENCOUNTER — APPOINTMENT (OUTPATIENT)
Dept: CT IMAGING | Facility: HOSPITAL | Age: 84
End: 2019-01-01

## 2019-01-01 ENCOUNTER — HOSPITAL ENCOUNTER (EMERGENCY)
Facility: HOSPITAL | Age: 84
Discharge: HOME | End: 2019-01-02
Attending: EMERGENCY MEDICINE | Admitting: EMERGENCY MEDICINE

## 2019-01-01 DIAGNOSIS — S01.81XA FACIAL LACERATION, INITIAL ENCOUNTER: Primary | ICD-10-CM

## 2019-01-01 DIAGNOSIS — S00.83XA FACIAL CONTUSION, INITIAL ENCOUNTER: ICD-10-CM

## 2019-01-01 DIAGNOSIS — S02.85XA CLOSED FRACTURE OF ORBITAL WALL, INITIAL ENCOUNTER (HCC): ICD-10-CM
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
## 2019-01-01 PROCEDURE — 25010000002 TDAP 5-2.5-18.5 LF-MCG/0.5 SUSPENSION: Performed by: EMERGENCY MEDICINE

## 2019-01-01 PROCEDURE — 72125 CT NECK SPINE W/O DYE: CPT

## 2019-01-01 PROCEDURE — 70450 CT HEAD/BRAIN W/O DYE: CPT

## 2019-01-01 PROCEDURE — 90471 IMMUNIZATION ADMIN: CPT | Performed by: EMERGENCY MEDICINE

## 2019-01-01 PROCEDURE — 99284 EMERGENCY DEPT VISIT MOD MDM: CPT

## 2019-01-01 PROCEDURE — 90715 TDAP VACCINE 7 YRS/> IM: CPT | Performed by: EMERGENCY MEDICINE

## 2019-01-01 RX ORDER — CLONAZEPAM 0.5 MG/1
0.5 TABLET ORAL 2 TIMES DAILY PRN
COMMUNITY

## 2019-01-01 RX ORDER — QUETIAPINE FUMARATE 25 MG/1
25 TABLET, FILM COATED ORAL NIGHTLY
COMMUNITY

## 2019-01-01 RX ORDER — ALLOPURINOL 100 MG/1
100 TABLET ORAL 2 TIMES DAILY
COMMUNITY

## 2019-01-01 RX ORDER — BACITRACIN, NEOMYCIN, POLYMYXIN B 400; 3.5; 5 [USP'U]/G; MG/G; [USP'U]/G
1 OINTMENT TOPICAL ONCE
Status: COMPLETED | OUTPATIENT
Start: 2019-01-01 | End: 2019-01-01

## 2019-01-01 RX ORDER — ACETAMINOPHEN 500 MG
500 TABLET ORAL EVERY 4 HOURS PRN
COMMUNITY

## 2019-01-01 RX ORDER — ESCITALOPRAM OXALATE 10 MG/1
10 TABLET ORAL DAILY
COMMUNITY

## 2019-01-01 RX ORDER — OMEPRAZOLE 20 MG/1
20 CAPSULE, DELAYED RELEASE ORAL DAILY
COMMUNITY

## 2019-01-01 RX ORDER — PRAMIPEXOLE DIHYDROCHLORIDE 1 MG/1
1 TABLET ORAL NIGHTLY
COMMUNITY

## 2019-01-01 RX ADMIN — BACITRACIN, NEOMYCIN, POLYMYXIN B 1 G: 400; 3.5; 5 OINTMENT TOPICAL at 23:49

## 2019-01-01 RX ADMIN — TETANUS TOXOID, REDUCED DIPHTHERIA TOXOID AND ACELLULAR PERTUSSIS VACCINE, ADSORBED 0.5 ML: 5; 2.5; 8; 8; 2.5 SUSPENSION INTRAMUSCULAR at 23:04

## 2019-01-01 RX ADMIN — LIDOCAINE HYDROCHLORIDE 10 ML: 10; .005 INJECTION, SOLUTION EPIDURAL; INFILTRATION; INTRACAUDAL; PERINEURAL at 23:13

## 2019-01-02 ENCOUNTER — APPOINTMENT (OUTPATIENT)
Dept: CT IMAGING | Facility: HOSPITAL | Age: 84
End: 2019-01-02

## 2019-01-02 VITALS
HEIGHT: 66 IN | SYSTOLIC BLOOD PRESSURE: 140 MMHG | HEART RATE: 88 BPM | BODY MASS INDEX: 26.68 KG/M2 | RESPIRATION RATE: 16 BRPM | DIASTOLIC BLOOD PRESSURE: 60 MMHG | WEIGHT: 166 LBS | TEMPERATURE: 98.2 F | OXYGEN SATURATION: 98 %

## 2019-01-02 PROCEDURE — 70486 CT MAXILLOFACIAL W/O DYE: CPT

## 2019-01-02 RX ORDER — AMOXICILLIN AND CLAVULANATE POTASSIUM 875; 125 MG/1; MG/1
1 TABLET, FILM COATED ORAL 2 TIMES DAILY
Qty: 14 TABLET | Refills: 0 | Status: SHIPPED | OUTPATIENT
Start: 2019-01-02 | End: 2019-01-09

## 2019-01-02 NOTE — DISCHARGE INSTRUCTIONS
Return to the ED in 5-7 days for suture removal. Keep wounds clean with soap and water. Avoid allowing patient to manipulate the wounds as best as possible.

## 2019-01-02 NOTE — ED PROVIDER NOTES
Subjective   Ms. Meche Duarte is a pleasantly demented 89 year old female with a hx of Parkinson's Disease, frequent falls, and difficulty walking who presents to the ED via EMS from [facility] with c/o two facial lacerations s/p fall. EMS personnel report patient fell from bed this evening around 2050 with her glasses on, striking her head and sustaining two lacerations to her right face, one above and one below her right eye. Since the fall, patient has been at baseline mentation. No changes noted to her behavior. Patient is not anticoagulated. In the emergency room, patient denies any aches or pains at this time.        History provided by:  EMS personnel, nursing home and medical records  History limited by:  Dementia  Fall   Mechanism of injury: fall    Injury location:  Face  Facial injury location:  Forehead and R cheek  Incident location:  long term  Time since incident:  1 hour  Arrived directly from scene: yes    Fall:     Fall occurred:  From a bed    Impact surface:  Hard floor    Point of impact:  Face  Protective equipment: none    Suspicion of alcohol use: no    Suspicion of drug use: no    Associated symptoms: no abdominal pain, no back pain, no chest pain and no neck pain    Risk factors: COPD and kidney disease        Review of Systems   Unable to perform ROS: Dementia   Constitutional: Negative for fever.   Respiratory: Negative for shortness of breath.    Cardiovascular: Negative for chest pain.   Gastrointestinal: Negative for abdominal pain.   Musculoskeletal: Negative for arthralgias, back pain, myalgias and neck pain.   Skin: Positive for wound (facial lacerations).       Past Medical History:   Diagnosis Date   • Abnormal posture    • Acute kidney failure (CMS/HCC)    • Alzheimer disease    • Anxiety    • Conjunctivitis    • Constipation    • Contracture of ankle    • COPD (chronic obstructive pulmonary disease) (CMS/HCC)    • Dementia    • Depressive disorder    • Difficulty walking    •  GERD (gastroesophageal reflux disease)    • Hypertension    • Muscle weakness    • Parkinson disease (CMS/HCC)    • Pneumonia    • Psychosis (CMS/HCC)    • Psychotic disorder with delusions (CMS/HCC)    • Repeated falls    • Restless leg syndrome        Allergies   Allergen Reactions   • Aspirin Other (See Comments)     unsure       Past Surgical History:   Procedure Laterality Date   • HYSTERECTOMY     • JOINT REPLACEMENT         History reviewed. No pertinent family history.    Social History     Socioeconomic History   • Marital status:      Spouse name: Not on file   • Number of children: Not on file   • Years of education: Not on file   • Highest education level: Not on file   Tobacco Use   • Smoking status: Unknown If Ever Smoked   Substance and Sexual Activity   • Alcohol use: No     Frequency: Never   • Drug use: No         Objective   Physical Exam   Constitutional: She appears well-developed and well-nourished. No distress.   HENT:   Head: Normocephalic. Head is with laceration.   3 centimeter laceration above the right eye on the forehead. 1.5 centimeter laceration below the right eye.   Eyes: Conjunctivae and EOM are normal. No scleral icterus.   EOMI. Chronic discoloration and clouding of the right cornea. 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   Neck: Normal range of motion. Neck supple.   Cardiovascular: Normal rate, regular rhythm, normal heart sounds and intact distal pulses. Exam reveals no gallop and no friction rub.   No murmur heard.  Pulmonary/Chest: Effort normal and breath sounds normal. No respiratory distress. She has no wheezes. She has no rales.   Abdominal: Soft. Bowel sounds are normal. There is no tenderness. There is no guarding.   Musculoskeletal: Normal range of motion.   No cervical, thoracic, or lumbar tenderness. No tenderness or pain upon gentle range of motion of the lower extremities.   Neurological: She is alert. She is  disoriented.   Generalized weakness, likely baseline. No focal deficits. Oriented to person and place, not time.   Skin: Skin is warm and dry. She is not diaphoretic.   Psychiatric: She has a normal mood and affect. Her behavior is normal.   Nursing note and vitals reviewed.      Laceration Repair  Date/Time: 1/1/2019 11:20 PM  Performed by: Jarrett Gillette DO  Authorized by: Jarrett Gillette DO     Consent:     Consent obtained:  Verbal    Consent given by:  Guardian  Anesthesia (see MAR for exact dosages):     Anesthesia method:  Local infiltration (5 cc)    Local anesthetic:  Lidocaine 1% WITH epi  Laceration details:     Location:  Face    Face location:  Forehead    Length (cm):  3  Repair type:     Repair type:  Simple  Pre-procedure details:     Preparation:  Patient was prepped and draped in usual sterile fashion  Exploration:     Contaminated: no    Treatment:     Area cleansed with:  Saline (300 cc)    Amount of cleaning:  Standard    Visualized foreign bodies/material removed: no    Skin repair:     Repair method:  Sutures    Suture size:  5-0    Suture material:  Nylon    Suture technique:  Simple interrupted    Number of sutures:  7  Approximation:     Approximation:  Close    Vermilion border: well-aligned    Post-procedure details:     Patient tolerance of procedure:  Tolerated well, no immediate complications  Laceration Repair  Date/Time: 1/1/2019 11:20 PM  Performed by: Jarrett Gillette DO  Authorized by: Jarrett Gillette DO     Consent:     Consent obtained:  Verbal    Consent given by:  Guardian  Anesthesia (see MAR for exact dosages):     Anesthesia method:  None  Laceration details:     Location:  Face    Face location:  R cheek    Length (cm):  1.5  Repair type:     Repair type:  Simple  Pre-procedure details:     Preparation:  Patient was prepped and draped in usual sterile fashion  Exploration:     Contaminated: no    Treatment:     Area cleansed with:  Saline (150 cc)    Amount of cleaning:  Standard     Visualized foreign bodies/material removed: no    Skin repair:     Repair method:  Tissue adhesive (Dermabond)  Approximation:     Approximation:  Close    Vermilion border: well-aligned    Post-procedure details:     Patient tolerance of procedure:  Tolerated well, no immediate complications  Comments:      Patient's family preferred Dermabond for the laceration below the eye. They understand the risk that patient may manipulate the wound and ultimately dehisce it.             ED Course  ED Course as of Jan 02 0159 Wed Jan 02, 2019   0005 I suspect the CT C spine findings are chronic.  Pt denies neck pain.  No acute abnormalities noted on CT head other than facial contusion.  Family is hesitant regarding additional imaging and has pleasantly declined laboratory workup today, which I believe is reasonable on this patient.  [CP]   0158 Patient is alert, acting normally and requesting food.  She stated multiple times throughout her emergency department stay that she is hungry.  Her family member that is with her states that she is acting completely normal and that she is ready for discharge.  The patient denies any neck pain and she has full range of motion of her neck.  If in this, I suspect the findings on the CT scan are chronic findings.  [CP]      ED Course User Index  [CP] Jarrett Gillette, DO       No results found for this or any previous visit (from the past 24 hour(s)).  Note: In addition to lab results from this visit, the labs listed above may include labs taken at another facility or during a different encounter within the last 24 hours. Please correlate lab times with ED admission and discharge times for further clarification of the services performed during this visit.    CT Maxillofacial Without Contrast   Final Result   1. Right preseptal and periorbital soft tissue swelling is visualized.    2. Lamina papyracea fracture of the medial right orbital wall.    3. Effusions are visualized within the left  mastoid air cells, without    visualized acute fracture.    4. Periapical lucency is identified within the right anterior mandible,    consistent with an odontogenic cyst or abscess. Clinical correlation is    recommended.    5. Additional findings described above.      THIS DOCUMENT HAS BEEN ELECTRONICALLY SIGNED BY MELI GUO MD      CT Cervical Spine Without Contrast   Final Result   1. No acute cervical spine fracture.    2. There is loss of normal cervical lordosis. Grade I anterolisthesis of C3 on    C4. Posterior subluxation of the left C1 lateral mass on C2.    3. Spondylosis is visualized at multiple cervical levels.    4. Mild/moderate narrowing of the thecal sac is visualized at C3-4. There is    mild narrowing of the thecal sac at C4-5 and C5-6, as well as at the level of    the dens process.    5. Additional findings described above.      THIS DOCUMENT HAS BEEN ELECTRONICALLY SIGNED BY MELI GUO MD      CT Head Without Contrast   Final Result   1. Soft tissue swelling/hematoma of the right frontal scalp and supraorbital    region. A tiny focus of gas is visualized in this region, consistent with    laceration. A facial CT is suggested.    2. No acute intracranial hemorrhage or acute territorial type infarct.    3. Small chronic lacunar infarcts within the basal ganglia. A chronic infarct    is visualized involving the right cerebellar lobe.    4. There are periventricular foci of hypodensity, likely representing small    vessel ischemic disease in a patient this age.    5. Stable atrophy.    6. Effusions are visualized within the left mastoid air cells.       THIS DOCUMENT HAS BEEN ELECTRONICALLY SIGNED BY MELI GUO MD        Vitals:    01/01/19 2250 01/01/19 2300 01/01/19 2330 01/02/19 0030   BP:  132/57 142/60    Pulse: 60 66 81 94   Resp:       Temp:       TempSrc:       SpO2:    96%   Weight:       Height:         Medications   Tdap (BOOSTRIX) injection 0.5 mL (0.5 mL Intramuscular  Given 1/1/19 2304)   lidocaine-EPINEPHrine (XYLOCAINE W/EPI) 1 %-1:985550 injection 10 mL (10 mL Injection Given by Other 1/1/19 2313)   neomycin-bacitracin-polymyxin (NEOSPORIN) ointment 1 g (1 g Topical Given 1/1/19 8689)     ECG/EMG Results (last 24 hours)     ** No results found for the last 24 hours. **                      [facility]    Final diagnoses:   Fall from bed, initial encounter   Facial laceration, initial encounter   Facial contusion, initial encounter   Closed fracture of orbital wall, initial encounter (CMS/[facility])       Documentation assistance provided by oziel Jaimes.  Information recorded by the scribe was done at my direction and has been verified and validated by me.     Vaughn Jaimes  01/01/19 2244       Ujoseph, Vaughn  01/01/19 2246       Fiona, Vaughn  01/01/19 2350       Ujoseph, Vaughn  01/01/19 2351       Fiona, Vaughn  01/01/19 2351       Fiona, Vaughn  01/02/19 0100       Jarrett Gillette DO  01/02/19 0159

## 2023-07-31 NOTE — PROGRESS NOTES
Acute Care - Physical Therapy Initial Evaluation  [facility]     Patient Name: Meche Duarte  : 1927  MRN: [id number]  Today's Date: 2017   Onset of Illness/Injury or Date of Surgery Date: 17  Date of Referral to PT: 17  Referring Physician: Edmund      Admit Date: 2017     Visit Dx:    ICD-10-CM ICD-9-CM   1. Weakness R53.1 780.79   2. Altered mental status, unspecified altered mental status type R41.82 780.97   3. Pneumonia of left lower lobe due to infectious organism J18.9 483.8   4. Impaired mobility and ADLs Z74.09 799.89     Patient Active Problem List   Diagnosis   • Weakness   • ALMITA (acute kidney injury)   • HTN (hypertension)   • RLS (restless legs syndrome)   • Altered mental status   • [place] (hard of hearing)   • Asthma   • Shortness of breath   • CAP (community acquired pneumonia)     Past Medical History:   Diagnosis Date   • Asthma    • CHF (congestive heart failure)    • Falls frequently    • GERD (gastroesophageal reflux disease)    • [place] (hard of hearing)    • Hypertension    • Restless leg syndrome    • Shingles     Right Eye     Past Surgical History:   Procedure Laterality Date   • HYSTERECTOMY     • JOINT REPLACEMENT      Bilateral knee replacements   • OTHER SURGICAL HISTORY      pins in Right wrist from fall          PT ASSESSMENT (last 72 hours)      PT Evaluation       1715    Rehab Evaluation    Document Type evaluation  -EH evaluation  -EL    Subjective Information no complaints;agree to therapy  -EH agree to therapy;no complaints  -EL    Patient Effort, Rehab Treatment  good  -EL    Symptoms Noted During/After Treatment other (see comments)  -EH none  -EL    Symptoms Noted Comment Incontinence during sup>sit, Bed>chair  -EH Pt [place] and with fear of falling due to multiple falls at home recently   -EL    General Information    Patient Profile Review yes  -EH yes  -EL    Onset of Illness/Injury or Date of Surgery Date 17  -EH 17  -EL  "   Referring Physician domenico  - ZENA Shaffer  -    General Observations Pt in fowlers, dtr in room. IV intact; Dr.s Duarte and Abilio soon enter  - Pt supine in bed on arrival, SCDs, IV R UE, daughter present   -EL    Pertinent History Of Current Problem ED with AMS with fatiuge noted by HHPT, dtr. Decreased responsiveness, lethargy, urinary incontinence, nausea, 7-8 falls in last 6 months. C/O progressive weakness, subjective parkinson's type symtoms. CXA with L retrocardiac infiltrate. Dtr reports pt with small struggling steps, unable to take side steps.  - Pt with progressive weakness and frequent falls x6 months (reports 7-8 falls). Was receiving  PT, who called EMS 4/4/17 due to pt lethargic and with decreased command following. Urinary incontinence en route to ED. CT head neg. CXR showed retrocardiac infiltrate. h/o \"Parkinson's like syndrome,\" RLS, and pt very [place].  -    Precautions/Limitations fall precautions   exit alarm  - fall precautions  -EL    Prior Level of Function mod assist:;bed mobility;transfer;gait;dressing  - min assist:;all household mobility;transfer;grooming;mod assist:;dressing;bathing;dependent:;home management;cooking;cleaning;driving;shopping  -    Equipment Currently Used at Home walker, rolling  - bath bench;shower chair;walker, rolling;wheelchair   was sponge bathing PTA; no tub transfers  -    Plans/Goals Discussed With patient and family;agreed upon  - patient and family;agreed upon  -    Risks Reviewed patient and family:;LOB;increased discomfort;dizziness;change in vital signs  - patient and family:;increased discomfort  -EL    Benefits Reviewed  patient and family:;improve function;increase independence  -EL    Barriers to Rehab  previous functional deficit  -EL    Living Environment    Lives With child(paulette), adult  - child(paulette), adult   daughter   -    Living Arrangements house  - house  -    Home Accessibility tub/shower is not walk in "   sponge bathes  - tub/shower is not walk in;stairs to enter home;bed and bath on same level  -    Number of Stairs to Enter Home  1  -EL    Living Environment Comment  Pt's daughter reports functional decline over past 6 mo. She now requires assist for bed mobility, transfers, and was walking short distances to bathroom with  PT.   -    Clinical Impression    Date of Referral to PT 04/04/17  -     PT Diagnosis Decreased functional mobility, impaired posture, motor planing, coordination  -     Rehab Potential fair, will monitor progress closely  -     Vital Signs    Pre Systolic BP Rehab 131  -  -EL    Pre Treatment Diastolic BP 54  -EH 54  -EL    Intra Systolic BP Rehab 129  -  -EL    Intra Treatment Diastolic BP 74  -EH 74  -EL    Post Systolic BP Rehab 179  -  -EL    Post Treatment Diastolic BP 79  -EH 79  -EL    Pretreatment Heart Rate (beats/min) 73  -EH 73  -EL    Intratreatment Heart Rate (beats/min) 122  -  -EL    Posttreatment Heart Rate (beats/min) 97  -EH 97  -EL    Pre SpO2 (%) 95  -EH 95  -EL    O2 Delivery Pre Treatment room air  - room air  -EL    Post SpO2 (%) 94  -EH 94  -EL    O2 Delivery Post Treatment room air  - room air  -EL    Pre Patient Position Supine  - Supine  -EL    Intra Patient Position Sitting  - Sitting  -EL    Post Patient Position Standing  - Standing  -EL    Pain Assessment    Pain Assessment No/denies pain  - No/denies pain  -EL    Vision Assessment/Intervention    Visual Impairment visual impairment, right   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  - visual impairment, right   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  -    Cognitive Assessment/Intervention    Current Cognitive/Communication Assessment functional  - functional  -EL    Orientation Status oriented x 4  - oriented to;person;place;situation  -    Follows Commands/Answers Questions 100% of the time;able to follow single-step instructions  - able to follow single-step  instructions;75% of the time;needs cueing;needs increased time;needs repetition   [facility]; hearing aid in R ear   -    Personal Safety WNL/WFL  - mild impairment;decreased awareness, need for safety  -    Personal Safety Interventions fall prevention program maintained;elopement precautions initiated;gait belt;nonskid shoes/slippers when out of bed;supervised activity  - elopement precautions initiated;fall prevention program maintained;gait belt;nonskid shoes/slippers when out of bed  -    ROM (Range of Motion)    General ROM  upper extremity range of motion deficits identified  -    General ROM Detail in supine pt unable to reach neutral DF; in standing pt appears able to flatten foot; remaining WFL.  - B shoulders 50% limited; remaining B UE WFL   -EL    MMT (Manual Muscle Testing)    General MMT Assessment  upper extremity strength deficits identified  -    General MMT Assessment Detail 4/5 hip/ knee extension demo'd; 5/5 great toe extension  - B shoulders 3-/5; remaining B UE 3+/5   -    Bed Mobility, Assessment/Treatment    Bed Mobility, Assistive Device bed rails;head of bed elevated;draw sheet  - bed rails;head of bed elevated  -    Bed Mob, Supine to Sit, [place] moderate assist (50% patient effort);2 person assist required  - moderate assist (50% patient effort);2 person assist required;verbal cues required  -    Bed Mob, Sit to Supine, [place] not tested  -     Bed Mobility, Comment pt controls posture of trunk, however needs assist to move hips and legs toward EOB  - Pt required min assist to move B LE to EOB; mod assist to lift trunk from HOB; able to scoot hips to EOB with mod assist and VC; required increased time to complete   -    Transfer Assessment/Treatment    Transfers, Bed-Chair [place] minimum assist (75% patient effort);2 person assist required  - minimum assist (75% patient effort);2 person assist required;verbal cues required   max VC for  sequencing steps   -EL    Transfers, Sit-Stand [place] moderate assist (50% patient effort);2 person assist required  - moderate assist (50% patient effort);2 person assist required;verbal cues required  -EL    Transfers, Stand-Sit [place] 2 person assist required;moderate assist (50% patient effort)  - moderate assist (50% patient effort);2 person assist required;verbal cues required  -EL    Transfers, Sit-Stand-Sit, Assist Device rolling walker  - rolling walker  -    Transfer, Safety Issues balance decreased during turns;sequencing ability decreased  - balance decreased during turns;step length decreased;weight-shifting ability decreased  -EL    Transfer, Impairments strength decreased;impaired balance  - strength decreased;impaired balance  -EL    Transfer, Comment demo correct HP; pt switched to pull up onto PT instead of one hand on walker. hygiene completed in standing.  - demo correct hand placement without need for cueing; VC for upright posture in standing and to extend hips; pt with BM and urinary incontinence upon standing; completed hygiene standing at EOB   -    Gait Assessment/Treatment    Gait, [place] Level minimum assist (75% patient effort);2 person assist required  -     Gait, Assistive Device rolling walker  -     Gait, Distance (Feet) 5  -     Gait, Gait Pattern Analysis swing-to gait  -     Gait, Gait Deviations bilateral:;step length decreased;forward flexed posture;decreased heel strike;toe-to-floor clearance decreased;weight-shifting ability decreased  -     Gait, Comment Pt unable to progress foot effectively; when PT attempts to assist pt still plants foot at step length prior desptue cueing of larger steps. Assist with movement of walker/posture  -     Motor Skills/Interventions    Additional Documentation  Balance Skills Training (Group)  -    Balance Skills Training    Sitting-Level of Assistance Contact guard  - Contact guard  -     Sitting-Balance Support  Feet supported;Right upper extremity supported;Left upper extremity supported  -    Standing-Level of Assistance  Minimum assistance;x2  -EL    Static Standing Balance Support Right upper extremity supported;Left upper extremity supported;assistive device  - assistive device  -    Standing-Balance Activities --   posture correction  - Weight Shift A-P;Weight Shift R-L  -EL    Standing Balance # of Minutes 3  -EH     Gait Balance-Level of Assistance Minimum assistance;x2  -EH Minimum assistance;x2  -EL    Gait Balance Support assistive device;Right upper extremity supported;Left upper extremity supported  - assistive device  -EL    Gait Balance Activities  scanning environment R/L;side-stepping  -EL    Sensory Assessment/Intervention    Light Touch LLE;RLE  -EH LUE;RUE  -EL    LUE Light Touch  WNL  -EL    RUE Light Touch  WNL  -EL    LLE Light Touch WNL  -EH     RLE Light Touch WNL  -EH     Positioning and Restraints    Pre-Treatment Position in bed  -EH in bed  -EL    Post Treatment Position chair  - chair  -    In Chair notified nsg;reclined;encouraged to call for assist;call light within reach;exit alarm on;with family/caregiver  - notified nsg;reclined;sitting;call light within reach;encouraged to call for assist;exit alarm on;with family/caregiver;legs elevated  -      04/04/17 1612 04/04/17 1343    General Information    Equipment Currently Used at Home  walker, rolling;wheelchair  -ST    Living Environment    Lives With child(paulette), adult  -KZ child(paulette), adult  -ST    Living Arrangements house  -KZ house  -ST    Home Accessibility no concerns  -KZ     Stair Railings at Home outside, present at both sides  -KZ     Type of Financial/Environmental Concern none  -KZ     Transportation Available family or friend will provide  -KZ family or friend will provide  -ST      User Key  (r) = Recorded By, (t) = Taken By, (c) = Cosigned By    Initials Name Provider Type      Kierra Day, PT Physical Therapist    ST Rossy Powers, KANE Case Manager    MAI Meneses, RN Registered Nurse    EARLENE Amaya, OT Occupational Therapist          Physical Therapy Education     Title: PT OT SLP Therapies (Done)     Topic: Physical Therapy (Done)     Point: Mobility training (Done)    Learning Progress Summary    Learner Readiness Method Response Comment Documented by Status   Patient Acceptance E VU,NR   04/05/17 1046 Done               Point: Home exercise program (Done)    Learning Progress Summary    Learner Readiness Method Response Comment Documented by Status   Patient Acceptance E VU,NR   04/05/17 1046 Done               Point: Body mechanics (Done)    Learning Progress Summary    Learner Readiness Method Response Comment Documented by Status   Patient Acceptance E VU,NR   04/05/17 1046 Done               Point: Precautions (Done)    Learning Progress Summary    Learner Readiness Method Response Comment Documented by Status   Patient Acceptance E VU,[facility] 04/05/17 1046 Done                      User Key     Initials Effective Dates Name Provider Type Discipline     06/19/15 -  Kierra Day, PT Physical Therapist PT                PT Recommendation and Plan  Planned Therapy Interventions: balance training, bed mobility training, gait training, home exercise program, strengthening, transfer training  PT Frequency: daily  Plan of Care Review  Plan Of Care Reviewed With: patient  Outcome Summary/Follow up Plan: PT IE completed. Pt demonstrates gait ambnormalities causing an impairment of safety 2/2 inability to progress LEs effectively, turn to chair etc. Pt also needs assist for bed mobility and t/f. If able pt would benefit from inpatient rehab stay prior to return home 2/2 safety issues.           IP PT Goals       04/05/17 1046          Bed Mobility PT LTG    Bed Mobility PT LTG, Date Established 04/05/17  -      Bed Mobility PT LTG, Time to Achieve 2 wks   -EH      Bed Mobility PT LTG, Activity Type supine to sit/sit to supine  -EH      Bed Mobility PT LTG, [place] Level minimum assist (75% patient effort);2 person assist required  -EH      Transfer Training PT LTG    Transfer Training PT LTG, Date Established 04/05/17  -EH      Transfer Training PT LTG, Time to Achieve 2 wks  -EH      Transfer Training PT LTG, Activity Type sit to stand/stand to sit  -EH      Transfer Training PT LTG, [place] Level contact guard assist  -EH      Transfer Training PT LTG, Assist Device walker, rolling  -EH      Gait Training PT LTG    Gait Training Goal PT LTG, Date Established 04/05/17  -EH      Gait Training Goal PT LTG, Time to Achieve 2 wks  -EH      Gait Training Goal PT LTG, [place] Level minimum assist (75% patient effort)  -EH      Gait Training Goal PT LTG, Assist Device walker, rolling  -EH      Gait Training Goal PT LTG, Distance to Achieve 150  -EH      Stair Training PT LTG    Stair Training Goal PT LTG, Date Established 04/05/17  -EH      Stair Training Goal PT LTG, Time to Achieve 2 wks  -EH      Stair Training Goal PT LTG, Number of Steps 1  -EH      Stair Training Goal PT LTG, [place] Level minimum assist (75% patient effort)  -EH      Stair Training Goal PT LTG, Assist Device walker, rolling  -EH        User Key  (r) = Recorded By, (t) = Taken By, (c) = Cosigned By    Initials Name Provider Type     Kierra Day, PT Physical Therapist                Outcome Measures       04/05/17 0930          How much help from another person do you currently need...    Turning from your back to your side while in flat bed without using bedrails? 2  -EH      Moving from lying on back to sitting on the side of a flat bed without bedrails? 2  -EH      Moving to and from a bed to a chair (including a wheelchair)? 2  -EH      Standing up from a chair using your arms (e.g., wheelchair, bedside chair)? 2  -EH      Climbing 3-5 steps with a railing? 1  -EH       To walk in hospital room? 2  -      AM-PAC 6 Clicks Score 11  -      Functional Assessment    Outcome Measure Options AM-PAC 6 Clicks Basic Mobility (PT)  -        User Key  (r) = Recorded By, (t) = Taken By, (c) = Cosigned By    Initials Name Provider Type     Kierra Day PT Physical Therapist           Time Calculation:         PT Charges       04/05/17 1050          Time Calculation    Start Time 0930  -      PT Received On 04/05/17  -      PT Goal Re-Cert Due Date 04/15/17  -      Time Calculation- PT    Total Timed Code Minutes- PT 10 minute(s)  -        User Key  (r) = Recorded By, (t) = Taken By, (c) = Cosigned By    Initials Name Provider Type     Kierra Day PT Physical Therapist          Therapy Charges for Today     Code Description Service Date Service Provider Modifiers Qty    99020576658 HC PT THER PROC EA 15 MIN 4/5/2017 Kierra Day, PT GP 1    [id number] HC PT EVAL MOD COMPLEXITY 4 4/5/2017 Kierra Day, PT GP 1          PT G-Codes  Outcome Measure Options: AM-PAC 6 Clicks Basic Mobility (PT)      Kierra Day, PT  4/5/2017        36.6